# Patient Record
Sex: MALE | Race: WHITE | Employment: FULL TIME | URBAN - METROPOLITAN AREA
[De-identification: names, ages, dates, MRNs, and addresses within clinical notes are randomized per-mention and may not be internally consistent; named-entity substitution may affect disease eponyms.]

---

## 2022-02-04 ENCOUNTER — APPOINTMENT (OUTPATIENT)
Dept: GENERAL RADIOLOGY | Age: 57
DRG: 418 | End: 2022-02-04
Payer: COMMERCIAL

## 2022-02-04 ENCOUNTER — ANESTHESIA EVENT (OUTPATIENT)
Dept: OPERATING ROOM | Age: 57
DRG: 418 | End: 2022-02-04
Payer: COMMERCIAL

## 2022-02-04 ENCOUNTER — APPOINTMENT (OUTPATIENT)
Dept: CT IMAGING | Age: 57
DRG: 418 | End: 2022-02-04
Payer: COMMERCIAL

## 2022-02-04 ENCOUNTER — ANESTHESIA (OUTPATIENT)
Dept: OPERATING ROOM | Age: 57
DRG: 418 | End: 2022-02-04
Payer: COMMERCIAL

## 2022-02-04 ENCOUNTER — HOSPITAL ENCOUNTER (INPATIENT)
Age: 57
LOS: 1 days | Discharge: HOME | DRG: 418 | End: 2022-02-05
Attending: EMERGENCY MEDICINE | Admitting: INTERNAL MEDICINE
Payer: COMMERCIAL

## 2022-02-04 ENCOUNTER — APPOINTMENT (OUTPATIENT)
Dept: ULTRASOUND IMAGING | Age: 57
DRG: 418 | End: 2022-02-04
Payer: COMMERCIAL

## 2022-02-04 VITALS
OXYGEN SATURATION: 94 % | RESPIRATION RATE: 8 BRPM | DIASTOLIC BLOOD PRESSURE: 61 MMHG | TEMPERATURE: 97 F | SYSTOLIC BLOOD PRESSURE: 105 MMHG

## 2022-02-04 DIAGNOSIS — I10 UNCONTROLLED HYPERTENSION: ICD-10-CM

## 2022-02-04 DIAGNOSIS — K81.9 CHOLECYSTITIS: Primary | ICD-10-CM

## 2022-02-04 DIAGNOSIS — R10.11 RIGHT UPPER QUADRANT ABDOMINAL PAIN: ICD-10-CM

## 2022-02-04 LAB
A/G RATIO: 0.9 (ref 1.1–2.2)
ALBUMIN SERPL-MCNC: 3.6 G/DL (ref 3.4–5)
ALP BLD-CCNC: 187 U/L (ref 40–129)
ALT SERPL-CCNC: 35 U/L (ref 10–40)
AMYLASE: 57 U/L (ref 25–115)
ANION GAP SERPL CALCULATED.3IONS-SCNC: 11 MMOL/L (ref 3–16)
APTT: 34.9 SEC (ref 26.2–38.6)
AST SERPL-CCNC: 42 U/L (ref 15–37)
BASOPHILS ABSOLUTE: 0.1 K/UL (ref 0–0.2)
BASOPHILS RELATIVE PERCENT: 1.6 %
BILIRUB SERPL-MCNC: 0.6 MG/DL (ref 0–1)
BUN BLDV-MCNC: 22 MG/DL (ref 7–20)
CALCIUM SERPL-MCNC: 9.3 MG/DL (ref 8.3–10.6)
CHLORIDE BLD-SCNC: 103 MMOL/L (ref 99–110)
CO2: 24 MMOL/L (ref 21–32)
CREAT SERPL-MCNC: 0.7 MG/DL (ref 0.9–1.3)
EKG ATRIAL RATE: 66 BPM
EKG DIAGNOSIS: NORMAL
EKG P AXIS: 65 DEGREES
EKG P-R INTERVAL: 214 MS
EKG Q-T INTERVAL: 436 MS
EKG QRS DURATION: 98 MS
EKG QTC CALCULATION (BAZETT): 457 MS
EKG R AXIS: 15 DEGREES
EKG T AXIS: 20 DEGREES
EKG VENTRICULAR RATE: 66 BPM
EOSINOPHILS ABSOLUTE: 0.1 K/UL (ref 0–0.6)
EOSINOPHILS RELATIVE PERCENT: 2 %
ESTIMATED AVERAGE GLUCOSE: 128.4 MG/DL
ETHANOL: NORMAL MG/DL (ref 0–0.08)
GFR AFRICAN AMERICAN: >60
GFR NON-AFRICAN AMERICAN: >60
GLUCOSE BLD-MCNC: 173 MG/DL (ref 70–99)
HBA1C MFR BLD: 6.1 %
HCT VFR BLD CALC: 44.7 % (ref 40.5–52.5)
HEMOGLOBIN: 15 G/DL (ref 13.5–17.5)
INR BLD: 1.08 (ref 0.88–1.12)
LIPASE: 53 U/L (ref 13–60)
LYMPHOCYTES ABSOLUTE: 1.1 K/UL (ref 1–5.1)
LYMPHOCYTES RELATIVE PERCENT: 15.3 %
MCH RBC QN AUTO: 31.8 PG (ref 26–34)
MCHC RBC AUTO-ENTMCNC: 33.6 G/DL (ref 31–36)
MCV RBC AUTO: 94.8 FL (ref 80–100)
MONOCYTES ABSOLUTE: 1.2 K/UL (ref 0–1.3)
MONOCYTES RELATIVE PERCENT: 17.3 %
NEUTROPHILS ABSOLUTE: 4.5 K/UL (ref 1.7–7.7)
NEUTROPHILS RELATIVE PERCENT: 63.8 %
PDW BLD-RTO: 13.8 % (ref 12.4–15.4)
PLATELET # BLD: 110 K/UL (ref 135–450)
PMV BLD AUTO: 8.4 FL (ref 5–10.5)
POTASSIUM REFLEX MAGNESIUM: 3.8 MMOL/L (ref 3.5–5.1)
PRO-BNP: 73 PG/ML (ref 0–124)
PROTHROMBIN TIME: 12.2 SEC (ref 9.9–12.7)
RBC # BLD: 4.72 M/UL (ref 4.2–5.9)
SARS-COV-2, NAAT: NOT DETECTED
SODIUM BLD-SCNC: 138 MMOL/L (ref 136–145)
TOTAL PROTEIN: 7.6 G/DL (ref 6.4–8.2)
TROPONIN: <0.01 NG/ML
WBC # BLD: 7.1 K/UL (ref 4–11)

## 2022-02-04 PROCEDURE — 3700000001 HC ADD 15 MINUTES (ANESTHESIA): Performed by: SURGERY

## 2022-02-04 PROCEDURE — 2580000003 HC RX 258: Performed by: SURGERY

## 2022-02-04 PROCEDURE — 85730 THROMBOPLASTIN TIME PARTIAL: CPT

## 2022-02-04 PROCEDURE — 7100000000 HC PACU RECOVERY - FIRST 15 MIN: Performed by: SURGERY

## 2022-02-04 PROCEDURE — 0FB04ZX EXCISION OF LIVER, PERCUTANEOUS ENDOSCOPIC APPROACH, DIAGNOSTIC: ICD-10-PCS | Performed by: SURGERY

## 2022-02-04 PROCEDURE — 7100000001 HC PACU RECOVERY - ADDTL 15 MIN: Performed by: SURGERY

## 2022-02-04 PROCEDURE — 80053 COMPREHEN METABOLIC PANEL: CPT

## 2022-02-04 PROCEDURE — 88304 TISSUE EXAM BY PATHOLOGIST: CPT

## 2022-02-04 PROCEDURE — 2709999900 HC NON-CHARGEABLE SUPPLY: Performed by: SURGERY

## 2022-02-04 PROCEDURE — 84484 ASSAY OF TROPONIN QUANT: CPT

## 2022-02-04 PROCEDURE — 47563 LAPARO CHOLECYSTECTOMY/GRAPH: CPT | Performed by: SURGERY

## 2022-02-04 PROCEDURE — 2580000003 HC RX 258: Performed by: EMERGENCY MEDICINE

## 2022-02-04 PROCEDURE — 87635 SARS-COV-2 COVID-19 AMP PRB: CPT

## 2022-02-04 PROCEDURE — 6370000000 HC RX 637 (ALT 250 FOR IP): Performed by: SURGERY

## 2022-02-04 PROCEDURE — 3600000014 HC SURGERY LEVEL 4 ADDTL 15MIN: Performed by: SURGERY

## 2022-02-04 PROCEDURE — 83036 HEMOGLOBIN GLYCOSYLATED A1C: CPT

## 2022-02-04 PROCEDURE — 82150 ASSAY OF AMYLASE: CPT

## 2022-02-04 PROCEDURE — APPNB30 APP NON BILLABLE TIME 0-30 MINS: Performed by: NURSE PRACTITIONER

## 2022-02-04 PROCEDURE — 83690 ASSAY OF LIPASE: CPT

## 2022-02-04 PROCEDURE — A4217 STERILE WATER/SALINE, 500 ML: HCPCS | Performed by: SURGERY

## 2022-02-04 PROCEDURE — 1200000000 HC SEMI PRIVATE

## 2022-02-04 PROCEDURE — 83880 ASSAY OF NATRIURETIC PEPTIDE: CPT

## 2022-02-04 PROCEDURE — 2500000003 HC RX 250 WO HCPCS: Performed by: SURGERY

## 2022-02-04 PROCEDURE — 93010 ELECTROCARDIOGRAM REPORT: CPT | Performed by: INTERNAL MEDICINE

## 2022-02-04 PROCEDURE — 6360000002 HC RX W HCPCS: Performed by: NURSE ANESTHETIST, CERTIFIED REGISTERED

## 2022-02-04 PROCEDURE — 6360000002 HC RX W HCPCS: Performed by: SURGERY

## 2022-02-04 PROCEDURE — 6360000002 HC RX W HCPCS: Performed by: EMERGENCY MEDICINE

## 2022-02-04 PROCEDURE — 74177 CT ABD & PELVIS W/CONTRAST: CPT

## 2022-02-04 PROCEDURE — 96375 TX/PRO/DX INJ NEW DRUG ADDON: CPT

## 2022-02-04 PROCEDURE — 96376 TX/PRO/DX INJ SAME DRUG ADON: CPT

## 2022-02-04 PROCEDURE — 85610 PROTHROMBIN TIME: CPT

## 2022-02-04 PROCEDURE — 2580000003 HC RX 258: Performed by: INTERNAL MEDICINE

## 2022-02-04 PROCEDURE — 99284 EMERGENCY DEPT VISIT MOD MDM: CPT

## 2022-02-04 PROCEDURE — 0FT44ZZ RESECTION OF GALLBLADDER, PERCUTANEOUS ENDOSCOPIC APPROACH: ICD-10-PCS | Performed by: SURGERY

## 2022-02-04 PROCEDURE — 2500000003 HC RX 250 WO HCPCS: Performed by: NURSE ANESTHETIST, CERTIFIED REGISTERED

## 2022-02-04 PROCEDURE — 6360000004 HC RX CONTRAST MEDICATION: Performed by: SURGERY

## 2022-02-04 PROCEDURE — BF131ZZ FLUOROSCOPY OF GALLBLADDER AND BILE DUCTS USING LOW OSMOLAR CONTRAST: ICD-10-PCS | Performed by: SURGERY

## 2022-02-04 PROCEDURE — 6370000000 HC RX 637 (ALT 250 FOR IP): Performed by: INTERNAL MEDICINE

## 2022-02-04 PROCEDURE — 6360000002 HC RX W HCPCS: Performed by: ANESTHESIOLOGY

## 2022-02-04 PROCEDURE — 6370000000 HC RX 637 (ALT 250 FOR IP): Performed by: EMERGENCY MEDICINE

## 2022-02-04 PROCEDURE — G0378 HOSPITAL OBSERVATION PER HR: HCPCS

## 2022-02-04 PROCEDURE — 3700000000 HC ANESTHESIA ATTENDED CARE: Performed by: SURGERY

## 2022-02-04 PROCEDURE — 99222 1ST HOSP IP/OBS MODERATE 55: CPT | Performed by: SURGERY

## 2022-02-04 PROCEDURE — 2720000010 HC SURG SUPPLY STERILE: Performed by: SURGERY

## 2022-02-04 PROCEDURE — 96374 THER/PROPH/DIAG INJ IV PUSH: CPT

## 2022-02-04 PROCEDURE — 74300 X-RAY BILE DUCTS/PANCREAS: CPT

## 2022-02-04 PROCEDURE — 3600000004 HC SURGERY LEVEL 4 BASE: Performed by: SURGERY

## 2022-02-04 PROCEDURE — 6360000004 HC RX CONTRAST MEDICATION: Performed by: EMERGENCY MEDICINE

## 2022-02-04 PROCEDURE — 96365 THER/PROPH/DIAG IV INF INIT: CPT

## 2022-02-04 PROCEDURE — 88313 SPECIAL STAINS GROUP 2: CPT

## 2022-02-04 PROCEDURE — 94760 N-INVAS EAR/PLS OXIMETRY 1: CPT

## 2022-02-04 PROCEDURE — 88307 TISSUE EXAM BY PATHOLOGIST: CPT

## 2022-02-04 PROCEDURE — 47001 NDL BIOPSY LVR TM OTH MAJ PX: CPT | Performed by: SURGERY

## 2022-02-04 PROCEDURE — 82077 ASSAY SPEC XCP UR&BREATH IA: CPT

## 2022-02-04 PROCEDURE — 6370000000 HC RX 637 (ALT 250 FOR IP): Performed by: NURSE PRACTITIONER

## 2022-02-04 PROCEDURE — 85025 COMPLETE CBC W/AUTO DIFF WBC: CPT

## 2022-02-04 PROCEDURE — 36415 COLL VENOUS BLD VENIPUNCTURE: CPT

## 2022-02-04 PROCEDURE — 93005 ELECTROCARDIOGRAM TRACING: CPT | Performed by: EMERGENCY MEDICINE

## 2022-02-04 PROCEDURE — 76705 ECHO EXAM OF ABDOMEN: CPT

## 2022-02-04 RX ORDER — HYDROCODONE BITARTRATE AND ACETAMINOPHEN 5; 325 MG/1; MG/1
1 TABLET ORAL EVERY 6 HOURS PRN
Qty: 12 TABLET | Refills: 0 | Status: SHIPPED | OUTPATIENT
Start: 2022-02-04 | End: 2022-02-09

## 2022-02-04 RX ORDER — SODIUM CHLORIDE 9 MG/ML
25 INJECTION, SOLUTION INTRAVENOUS PRN
Status: DISCONTINUED | OUTPATIENT
Start: 2022-02-04 | End: 2022-02-05 | Stop reason: HOSPADM

## 2022-02-04 RX ORDER — SODIUM CHLORIDE, SODIUM LACTATE, POTASSIUM CHLORIDE, CALCIUM CHLORIDE 600; 310; 30; 20 MG/100ML; MG/100ML; MG/100ML; MG/100ML
INJECTION, SOLUTION INTRAVENOUS CONTINUOUS
Status: ACTIVE | OUTPATIENT
Start: 2022-02-04 | End: 2022-02-05

## 2022-02-04 RX ORDER — MAGNESIUM HYDROXIDE 1200 MG/15ML
LIQUID ORAL CONTINUOUS PRN
Status: COMPLETED | OUTPATIENT
Start: 2022-02-04 | End: 2022-02-04

## 2022-02-04 RX ORDER — HYDRALAZINE HYDROCHLORIDE 20 MG/ML
5 INJECTION INTRAMUSCULAR; INTRAVENOUS EVERY 10 MIN PRN
Status: DISCONTINUED | OUTPATIENT
Start: 2022-02-04 | End: 2022-02-04 | Stop reason: HOSPADM

## 2022-02-04 RX ORDER — HYDROCODONE BITARTRATE AND ACETAMINOPHEN 5; 325 MG/1; MG/1
1 TABLET ORAL EVERY 4 HOURS PRN
Status: DISCONTINUED | OUTPATIENT
Start: 2022-02-04 | End: 2022-02-05 | Stop reason: HOSPADM

## 2022-02-04 RX ORDER — TADALAFIL 5 MG/1
5 TABLET ORAL DAILY PRN
COMMUNITY

## 2022-02-04 RX ORDER — SODIUM CHLORIDE 0.9 % (FLUSH) 0.9 %
5-40 SYRINGE (ML) INJECTION EVERY 12 HOURS SCHEDULED
Status: DISCONTINUED | OUTPATIENT
Start: 2022-02-04 | End: 2022-02-05 | Stop reason: HOSPADM

## 2022-02-04 RX ORDER — LABETALOL HYDROCHLORIDE 5 MG/ML
10 INJECTION, SOLUTION INTRAVENOUS EVERY 4 HOURS PRN
Status: DISCONTINUED | OUTPATIENT
Start: 2022-02-04 | End: 2022-02-05 | Stop reason: HOSPADM

## 2022-02-04 RX ORDER — ONDANSETRON 2 MG/ML
4 INJECTION INTRAMUSCULAR; INTRAVENOUS
Status: DISCONTINUED | OUTPATIENT
Start: 2022-02-04 | End: 2022-02-04 | Stop reason: HOSPADM

## 2022-02-04 RX ORDER — MEPERIDINE HYDROCHLORIDE 25 MG/ML
12.5 INJECTION INTRAMUSCULAR; INTRAVENOUS; SUBCUTANEOUS EVERY 5 MIN PRN
Status: DISCONTINUED | OUTPATIENT
Start: 2022-02-04 | End: 2022-02-04 | Stop reason: HOSPADM

## 2022-02-04 RX ORDER — NICOTINE 21 MG/24HR
1 PATCH, TRANSDERMAL 24 HOURS TRANSDERMAL DAILY
Status: DISCONTINUED | OUTPATIENT
Start: 2022-02-04 | End: 2022-02-05 | Stop reason: HOSPADM

## 2022-02-04 RX ORDER — LABETALOL HYDROCHLORIDE 5 MG/ML
10 INJECTION, SOLUTION INTRAVENOUS EVERY 4 HOURS PRN
Status: DISCONTINUED | OUTPATIENT
Start: 2022-02-04 | End: 2022-02-04

## 2022-02-04 RX ORDER — ONDANSETRON 2 MG/ML
4 INJECTION INTRAMUSCULAR; INTRAVENOUS EVERY 6 HOURS PRN
Status: DISCONTINUED | OUTPATIENT
Start: 2022-02-04 | End: 2022-02-05 | Stop reason: HOSPADM

## 2022-02-04 RX ORDER — FENTANYL CITRATE 50 UG/ML
INJECTION, SOLUTION INTRAMUSCULAR; INTRAVENOUS PRN
Status: DISCONTINUED | OUTPATIENT
Start: 2022-02-04 | End: 2022-02-04 | Stop reason: SDUPTHER

## 2022-02-04 RX ORDER — KETAMINE HCL IN NACL, ISO-OSM 100MG/10ML
SYRINGE (ML) INJECTION PRN
Status: DISCONTINUED | OUTPATIENT
Start: 2022-02-04 | End: 2022-02-04 | Stop reason: SDUPTHER

## 2022-02-04 RX ORDER — LABETALOL HYDROCHLORIDE 5 MG/ML
5 INJECTION, SOLUTION INTRAVENOUS EVERY 10 MIN PRN
Status: DISCONTINUED | OUTPATIENT
Start: 2022-02-04 | End: 2022-02-04 | Stop reason: HOSPADM

## 2022-02-04 RX ORDER — MORPHINE SULFATE 4 MG/ML
4 INJECTION, SOLUTION INTRAMUSCULAR; INTRAVENOUS ONCE
Status: COMPLETED | OUTPATIENT
Start: 2022-02-04 | End: 2022-02-04

## 2022-02-04 RX ORDER — HYDROCODONE BITARTRATE AND ACETAMINOPHEN 5; 325 MG/1; MG/1
2 TABLET ORAL EVERY 4 HOURS PRN
Status: DISCONTINUED | OUTPATIENT
Start: 2022-02-04 | End: 2022-02-05 | Stop reason: HOSPADM

## 2022-02-04 RX ORDER — DEXAMETHASONE SODIUM PHOSPHATE 4 MG/ML
INJECTION, SOLUTION INTRA-ARTICULAR; INTRALESIONAL; INTRAMUSCULAR; INTRAVENOUS; SOFT TISSUE PRN
Status: DISCONTINUED | OUTPATIENT
Start: 2022-02-04 | End: 2022-02-04 | Stop reason: SDUPTHER

## 2022-02-04 RX ORDER — ONDANSETRON 4 MG/1
4 TABLET, ORALLY DISINTEGRATING ORAL EVERY 8 HOURS PRN
Status: DISCONTINUED | OUTPATIENT
Start: 2022-02-04 | End: 2022-02-05 | Stop reason: HOSPADM

## 2022-02-04 RX ORDER — SODIUM CHLORIDE, SODIUM LACTATE, POTASSIUM CHLORIDE, AND CALCIUM CHLORIDE .6; .31; .03; .02 G/100ML; G/100ML; G/100ML; G/100ML
IRRIGANT IRRIGATION
Status: COMPLETED | OUTPATIENT
Start: 2022-02-04 | End: 2022-02-04

## 2022-02-04 RX ORDER — OXYCODONE HYDROCHLORIDE 5 MG/1
5 TABLET ORAL EVERY 4 HOURS PRN
Status: DISCONTINUED | OUTPATIENT
Start: 2022-02-04 | End: 2022-02-04

## 2022-02-04 RX ORDER — MIDAZOLAM HYDROCHLORIDE 1 MG/ML
INJECTION INTRAMUSCULAR; INTRAVENOUS PRN
Status: DISCONTINUED | OUTPATIENT
Start: 2022-02-04 | End: 2022-02-04 | Stop reason: SDUPTHER

## 2022-02-04 RX ORDER — CALCIUM CARBONATE 200(500)MG
500 TABLET,CHEWABLE ORAL 3 TIMES DAILY PRN
Status: DISCONTINUED | OUTPATIENT
Start: 2022-02-04 | End: 2022-02-05 | Stop reason: HOSPADM

## 2022-02-04 RX ORDER — BUPIVACAINE HYDROCHLORIDE 5 MG/ML
INJECTION, SOLUTION EPIDURAL; INTRACAUDAL
Status: COMPLETED | OUTPATIENT
Start: 2022-02-04 | End: 2022-02-04

## 2022-02-04 RX ORDER — ONDANSETRON 2 MG/ML
INJECTION INTRAMUSCULAR; INTRAVENOUS PRN
Status: DISCONTINUED | OUTPATIENT
Start: 2022-02-04 | End: 2022-02-04 | Stop reason: SDUPTHER

## 2022-02-04 RX ORDER — LIDOCAINE HYDROCHLORIDE 20 MG/ML
INJECTION, SOLUTION INFILTRATION; PERINEURAL PRN
Status: DISCONTINUED | OUTPATIENT
Start: 2022-02-04 | End: 2022-02-04 | Stop reason: SDUPTHER

## 2022-02-04 RX ORDER — ROCURONIUM BROMIDE 10 MG/ML
INJECTION, SOLUTION INTRAVENOUS PRN
Status: DISCONTINUED | OUTPATIENT
Start: 2022-02-04 | End: 2022-02-04 | Stop reason: SDUPTHER

## 2022-02-04 RX ORDER — SUCCINYLCHOLINE CHLORIDE 20 MG/ML
INJECTION INTRAMUSCULAR; INTRAVENOUS PRN
Status: DISCONTINUED | OUTPATIENT
Start: 2022-02-04 | End: 2022-02-04 | Stop reason: SDUPTHER

## 2022-02-04 RX ORDER — MORPHINE SULFATE 2 MG/ML
2 INJECTION, SOLUTION INTRAMUSCULAR; INTRAVENOUS EVERY 4 HOURS PRN
Status: DISCONTINUED | OUTPATIENT
Start: 2022-02-04 | End: 2022-02-05 | Stop reason: HOSPADM

## 2022-02-04 RX ORDER — ONDANSETRON 2 MG/ML
4 INJECTION INTRAMUSCULAR; INTRAVENOUS EVERY 6 HOURS PRN
Status: DISCONTINUED | OUTPATIENT
Start: 2022-02-04 | End: 2022-02-04

## 2022-02-04 RX ORDER — HYDROMORPHONE HCL 110MG/55ML
0.5 PATIENT CONTROLLED ANALGESIA SYRINGE INTRAVENOUS EVERY 5 MIN PRN
Status: COMPLETED | OUTPATIENT
Start: 2022-02-04 | End: 2022-02-04

## 2022-02-04 RX ORDER — SODIUM CHLORIDE 0.9 % (FLUSH) 0.9 %
10 SYRINGE (ML) INJECTION PRN
Status: DISCONTINUED | OUTPATIENT
Start: 2022-02-04 | End: 2022-02-05 | Stop reason: HOSPADM

## 2022-02-04 RX ORDER — MORPHINE SULFATE 2 MG/ML
2 INJECTION, SOLUTION INTRAMUSCULAR; INTRAVENOUS
Status: DISCONTINUED | OUTPATIENT
Start: 2022-02-04 | End: 2022-02-05 | Stop reason: HOSPADM

## 2022-02-04 RX ORDER — AMLODIPINE BESYLATE 5 MG/1
5 TABLET ORAL DAILY
Status: DISCONTINUED | OUTPATIENT
Start: 2022-02-04 | End: 2022-02-05 | Stop reason: HOSPADM

## 2022-02-04 RX ORDER — ACETAMINOPHEN 650 MG/1
650 SUPPOSITORY RECTAL EVERY 6 HOURS PRN
Status: DISCONTINUED | OUTPATIENT
Start: 2022-02-04 | End: 2022-02-05 | Stop reason: HOSPADM

## 2022-02-04 RX ORDER — ACETAMINOPHEN 325 MG/1
650 TABLET ORAL EVERY 6 HOURS PRN
Status: DISCONTINUED | OUTPATIENT
Start: 2022-02-04 | End: 2022-02-05 | Stop reason: HOSPADM

## 2022-02-04 RX ORDER — PANTOPRAZOLE SODIUM 40 MG/1
40 TABLET, DELAYED RELEASE ORAL
Status: DISCONTINUED | OUTPATIENT
Start: 2022-02-04 | End: 2022-02-05 | Stop reason: HOSPADM

## 2022-02-04 RX ORDER — POLYETHYLENE GLYCOL 3350 17 G/17G
17 POWDER, FOR SOLUTION ORAL DAILY PRN
Status: DISCONTINUED | OUTPATIENT
Start: 2022-02-04 | End: 2022-02-05 | Stop reason: HOSPADM

## 2022-02-04 RX ORDER — PROPRANOLOL HYDROCHLORIDE 20 MG/1
10 TABLET ORAL 3 TIMES DAILY
Status: DISCONTINUED | OUTPATIENT
Start: 2022-02-04 | End: 2022-02-05 | Stop reason: HOSPADM

## 2022-02-04 RX ORDER — GLYCOPYRROLATE 0.2 MG/ML
INJECTION INTRAMUSCULAR; INTRAVENOUS PRN
Status: DISCONTINUED | OUTPATIENT
Start: 2022-02-04 | End: 2022-02-04 | Stop reason: SDUPTHER

## 2022-02-04 RX ORDER — PROPOFOL 10 MG/ML
INJECTION, EMULSION INTRAVENOUS PRN
Status: DISCONTINUED | OUTPATIENT
Start: 2022-02-04 | End: 2022-02-04 | Stop reason: SDUPTHER

## 2022-02-04 RX ADMIN — MIDAZOLAM 2 MG: 1 INJECTION INTRAMUSCULAR; INTRAVENOUS at 17:24

## 2022-02-04 RX ADMIN — Medication 10 ML: at 20:31

## 2022-02-04 RX ADMIN — AMLODIPINE BESYLATE 5 MG: 5 TABLET ORAL at 14:13

## 2022-02-04 RX ADMIN — ROCURONIUM BROMIDE 5 MG: 10 INJECTION, SOLUTION INTRAVENOUS at 17:33

## 2022-02-04 RX ADMIN — ROCURONIUM BROMIDE 35 MG: 10 INJECTION, SOLUTION INTRAVENOUS at 17:37

## 2022-02-04 RX ADMIN — PIPERACILLIN AND TAZOBACTAM 3375 MG: 3; .375 INJECTION, POWDER, LYOPHILIZED, FOR SOLUTION INTRAVENOUS at 17:21

## 2022-02-04 RX ADMIN — IOPAMIDOL 75 ML: 755 INJECTION, SOLUTION INTRAVENOUS at 02:28

## 2022-02-04 RX ADMIN — Medication 30 MG: at 17:52

## 2022-02-04 RX ADMIN — FENTANYL CITRATE 50 MCG: 50 INJECTION, SOLUTION INTRAMUSCULAR; INTRAVENOUS at 17:33

## 2022-02-04 RX ADMIN — MORPHINE SULFATE 2 MG: 2 INJECTION, SOLUTION INTRAMUSCULAR; INTRAVENOUS at 20:49

## 2022-02-04 RX ADMIN — PROPRANOLOL HYDROCHLORIDE 10 MG: 20 TABLET ORAL at 08:20

## 2022-02-04 RX ADMIN — LIDOCAINE HYDROCHLORIDE 100 MG: 20 INJECTION, SOLUTION INFILTRATION; PERINEURAL at 17:33

## 2022-02-04 RX ADMIN — FENTANYL CITRATE 50 MCG: 50 INJECTION, SOLUTION INTRAMUSCULAR; INTRAVENOUS at 17:51

## 2022-02-04 RX ADMIN — ONDANSETRON 4 MG: 2 INJECTION INTRAMUSCULAR; INTRAVENOUS at 01:46

## 2022-02-04 RX ADMIN — HYDROMORPHONE HYDROCHLORIDE 0.5 MG: 2 INJECTION, SOLUTION INTRAMUSCULAR; INTRAVENOUS; SUBCUTANEOUS at 18:55

## 2022-02-04 RX ADMIN — ONDANSETRON 4 MG: 2 INJECTION INTRAMUSCULAR; INTRAVENOUS at 17:43

## 2022-02-04 RX ADMIN — GLYCOPYRROLATE 0.2 MG: 0.2 INJECTION, SOLUTION INTRAMUSCULAR; INTRAVENOUS at 17:45

## 2022-02-04 RX ADMIN — SUCCINYLCHOLINE CHLORIDE 120 MG: 20 INJECTION, SOLUTION INTRAMUSCULAR; INTRAVENOUS at 17:34

## 2022-02-04 RX ADMIN — LIDOCAINE HYDROCHLORIDE: 20 SOLUTION ORAL; TOPICAL at 01:46

## 2022-02-04 RX ADMIN — SODIUM CHLORIDE, POTASSIUM CHLORIDE, SODIUM LACTATE AND CALCIUM CHLORIDE: 600; 310; 30; 20 INJECTION, SOLUTION INTRAVENOUS at 06:17

## 2022-02-04 RX ADMIN — DEXAMETHASONE SODIUM PHOSPHATE 8 MG: 4 INJECTION, SOLUTION INTRAMUSCULAR; INTRAVENOUS at 17:41

## 2022-02-04 RX ADMIN — HYDROMORPHONE HYDROCHLORIDE 0.5 MG: 2 INJECTION, SOLUTION INTRAMUSCULAR; INTRAVENOUS; SUBCUTANEOUS at 18:50

## 2022-02-04 RX ADMIN — SUGAMMADEX 200 MG: 100 INJECTION, SOLUTION INTRAVENOUS at 18:19

## 2022-02-04 RX ADMIN — Medication 10 ML: at 08:42

## 2022-02-04 RX ADMIN — HYDROMORPHONE HYDROCHLORIDE 0.5 MG: 2 INJECTION, SOLUTION INTRAMUSCULAR; INTRAVENOUS; SUBCUTANEOUS at 19:11

## 2022-02-04 RX ADMIN — PIPERACILLIN AND TAZOBACTAM 3375 MG: 3; .375 INJECTION, POWDER, LYOPHILIZED, FOR SOLUTION INTRAVENOUS at 03:07

## 2022-02-04 RX ADMIN — MORPHINE SULFATE 4 MG: 4 INJECTION INTRAVENOUS at 01:46

## 2022-02-04 RX ADMIN — PROPOFOL 50 MG: 10 INJECTION, EMULSION INTRAVENOUS at 18:07

## 2022-02-04 RX ADMIN — HYDROMORPHONE HYDROCHLORIDE 0.5 MG: 2 INJECTION, SOLUTION INTRAMUSCULAR; INTRAVENOUS; SUBCUTANEOUS at 19:00

## 2022-02-04 RX ADMIN — PROPRANOLOL HYDROCHLORIDE 10 MG: 20 TABLET ORAL at 13:34

## 2022-02-04 RX ADMIN — PIPERACILLIN AND TAZOBACTAM 3375 MG: 3; .375 INJECTION, POWDER, LYOPHILIZED, FOR SOLUTION INTRAVENOUS at 08:23

## 2022-02-04 RX ADMIN — PROPRANOLOL HYDROCHLORIDE 10 MG: 20 TABLET ORAL at 20:50

## 2022-02-04 RX ADMIN — PROPOFOL 200 MG: 10 INJECTION, EMULSION INTRAVENOUS at 17:33

## 2022-02-04 ASSESSMENT — PULMONARY FUNCTION TESTS
PIF_VALUE: 20
PIF_VALUE: 20
PIF_VALUE: 16
PIF_VALUE: 1
PIF_VALUE: 19
PIF_VALUE: 11
PIF_VALUE: 16
PIF_VALUE: 21
PIF_VALUE: 6
PIF_VALUE: 3
PIF_VALUE: 21
PIF_VALUE: 13
PIF_VALUE: 3
PIF_VALUE: 20
PIF_VALUE: 1
PIF_VALUE: 20
PIF_VALUE: 20
PIF_VALUE: 6
PIF_VALUE: 20
PIF_VALUE: 0
PIF_VALUE: 20
PIF_VALUE: 14
PIF_VALUE: 1
PIF_VALUE: 0
PIF_VALUE: 20
PIF_VALUE: 18
PIF_VALUE: 20
PIF_VALUE: 17
PIF_VALUE: 19
PIF_VALUE: 20
PIF_VALUE: 13
PIF_VALUE: 21
PIF_VALUE: 19
PIF_VALUE: 19
PIF_VALUE: 15
PIF_VALUE: 12
PIF_VALUE: 14
PIF_VALUE: 18
PIF_VALUE: 19
PIF_VALUE: 1
PIF_VALUE: 11
PIF_VALUE: 3
PIF_VALUE: 12
PIF_VALUE: 20
PIF_VALUE: 20
PIF_VALUE: 15
PIF_VALUE: 13
PIF_VALUE: 1
PIF_VALUE: 20
PIF_VALUE: 16
PIF_VALUE: 1
PIF_VALUE: 18
PIF_VALUE: 19
PIF_VALUE: 13
PIF_VALUE: 20
PIF_VALUE: 19
PIF_VALUE: 15
PIF_VALUE: 20
PIF_VALUE: 19
PIF_VALUE: 1

## 2022-02-04 ASSESSMENT — PAIN SCALES - GENERAL
PAINLEVEL_OUTOF10: 5
PAINLEVEL_OUTOF10: 7
PAINLEVEL_OUTOF10: 7
PAINLEVEL_OUTOF10: 9
PAINLEVEL_OUTOF10: 7
PAINLEVEL_OUTOF10: 7
PAINLEVEL_OUTOF10: 5
PAINLEVEL_OUTOF10: 8
PAINLEVEL_OUTOF10: 0

## 2022-02-04 ASSESSMENT — PAIN DESCRIPTION - PAIN TYPE
TYPE: SURGICAL PAIN

## 2022-02-04 ASSESSMENT — PAIN DESCRIPTION - LOCATION
LOCATION: ABDOMEN

## 2022-02-04 ASSESSMENT — LIFESTYLE VARIABLES: SMOKING_STATUS: 1

## 2022-02-04 NOTE — ED PROVIDER NOTES
[facility]        Pt Name: Bee Olsen  MRN: [id number]  [place] 1965  Date of evaluation: 2/4/2022  Provider: Norma Lorenz MD  PCP: Efrain Portillo MD    This patient was seen and evaluated by the attending physician No att. providers found. CHIEF COMPLAINT       Chief Complaint   Patient presents with    Abdominal Pain     Pt c/o abdominal pain under the ribs starting around 9pm tonight with N/V. Pt denies blood in emesis. Denies trauma to site but states he was exercising yesterday. HISTORY OF PRESENT ILLNESS   (Location/Symptom, Timing/Onset, Context/Setting, Quality, Duration, Modifying Factors, Severity)  Note limiting factors. Bee Olsen is a 62 y.o. male here today with a chief complaint of epigastric abdominal pain with radiation right upper quadrant into his chest and throat all the way to his teeth. Some associated nausea but no vomiting or diarrhea. No fevers chills sweats no headaches no chest pain no dyspnea no palpitations. Denies any significant past medical history but appears have history of some liver disease from prior alcohol abuse. Also patient had a pertinent hx for for paracentesis and a herniarraphy. Nursing Notes were all reviewed and agreed with or any disagreements were addressed  in the HPI. REVIEW OF SYSTEMS    (2-9 systems for level 4, 10 or more for level 5)     Review of Systems    Positives and Pertinent negatives as per HPI. Except as noted abovein the ROS, all other systems were reviewed and negative. PAST MEDICAL HISTORY     Past Medical History:   Diagnosis Date    Alcohol abuse     Liver disease     hyperbilirubinemia         SURGICAL HISTORY     Past Surgical History:   Procedure Laterality Date    PARACENTESIS  12/28/10         CURRENTMEDICATIONS       Previous Medications    MULTIPLE VITAMIN (MULTIVITAMINS PO)    Take  by mouth.          ALLERGIES Patient has no known allergies. FAMILYHISTORY       Family History   Problem Relation Age of Onset    Heart Disease Father 48    High Blood Pressure Mother           SOCIAL HISTORY       Social History     Socioeconomic History    Marital status:      Spouse name: None    Number of children: None    Years of education: None    Highest education level: None   Occupational History    None   Tobacco Use    Smoking status: Current Every Day Smoker     Packs/day: 0.50     Years: 20.00     Pack years: 10.00     Types: Cigarettes     Last attempt to quit: 12/3/2010     Years since quittin.1    Smokeless tobacco: Never Used    Tobacco comment: less than half pack/day   Vaping Use    Vaping Use: Every day    Devices: Pre-filled or refillable cartridge   Substance and Sexual Activity    Alcohol use: No     Comment: pt states has not drank in a month but did drink before that a 1/2 liter to 3/4 a day.  Drug use: No    Sexual activity: Yes     Partners: Female   Other Topics Concern    None   Social History Narrative    None     Social Determinants of Health     Financial Resource Strain:     Difficulty of Paying Living Expenses: Not on file   Food Insecurity:     Worried About Running Out of Food in the Last Year: Not on file    Telly of Food in the Last Year: Not on file   Transportation Needs:     Lack of Transportation (Medical): Not on file    Lack of Transportation (Non-Medical):  Not on file   Physical Activity:     Days of Exercise per Week: Not on file    Minutes of Exercise per Session: Not on file   Stress:     Feeling of Stress : Not on file   Social Connections:     Frequency of Communication with Friends and Family: Not on file    Frequency of Social Gatherings with Friends and Family: Not on file    Attends Mandaeism Services: Not on file    Active Member of Clubs or Organizations: Not on file    Attends Club or Organization Meetings: Not on file    Marital Status: Not on file   Intimate Partner Violence:     Fear of Current or Ex-Partner: Not on file    Emotionally Abused: Not on file    Physically Abused: Not on file    Sexually Abused: Not on file   Housing Stability:     Unable to Pay for Housing in the Last Year: Not on file    Number of [place] in the Last Year: Not on file    Unstable Housing in the Last Year: Not on file       SCREENINGS    [place] Coma Scale  Eye Opening: Spontaneous  Best Verbal Response: Oriented  Best Motor Response: Obeys commands  [place] Coma Scale Score: 15        PHYSICAL EXAM    (up to 7 for level 4, 8 or more for level 5)     ED Triage Vitals [02/04/22 0109]   BP Temp Temp src Pulse Resp SpO2 Height Weight   (!) 195/114 97.6 °F (36.4 °C) -- 73 20 99 % 5' 11\" (1.803 m) 185 lb (83.9 kg)       Physical Exam     General Appearance:  Alert, cooperative, no distress, appears stated age. Head:  Normocephalic, without obvious abnormality, atraumatic. Eyes:  conjunctiva/corneas clear, EOM's intact. Sclera anicteric. ENT: Mucous membranes moist.   Neck: Supple, symmetrical, trachea midline, no adenopathy. No jugular venous distention. Lungs:   No Respiratory Distress. Chest Wall:  Attrauamtic   Heart:  RRR   Abdomen:   Soft, TTP to epigastrium   Extremities:  Full range of motion. Pulses: Symmetric x4   Skin:  No rashes or lesions to exposed skin. Neurologic: Alert and oriented X 3. Motor grossly normal.  Speech clear.           DIAGNOSTIC RESULTS   LABS:    Labs Reviewed   CBC WITH AUTO DIFFERENTIAL - Abnormal; Notable for the following components:       Result Value    Platelets 071 (*)     All other components within normal limits    Narrative:     Performed at:  [facility] [address], [place], [address]   Phone [phone]   COMPREHENSIVE METABOLIC PANEL W/ REFLEX TO MG FOR LOW K - Abnormal; Notable for the following components:    Glucose 173 (*)     BUN 22 (*) CREATININE 0.7 (*)     Albumin/Globulin Ratio 0.9 (*)     Alkaline Phosphatase 187 (*)     AST 42 (*)     All other components within normal limits    Narrative:     Performed at:  [facility]  [address] [place], [address] [place]   Phone [phone]   CULTURE, URINE   LIPASE    Narrative:     Performed at:  [facility] [address] [place], [address]   Phone [phone]   AMYLASE    Narrative:     Performed at:  [facility] [address] [place], [address]   Phone [phone]   TROPONIN    Narrative:     Performed at:  [facility] [address], [address]   Phone [phone]   BRAIN NATRIURETIC PEPTIDE    Narrative:     Performed at:  [facility] [address], [address]   Phone [phone]   PROTIME-INR    Narrative:     Performed at:  [facility] [address] [place], [address]   Phone [phone]   APTT    Narrative:     Performed at:  [facility] [address], [address]   Phone [phone]   ETHANOL    Narrative:     Performed at:  [facility] [address], [address]   Phone [phone]   URINALYSIS       All other labs were within normal range or not returned as of this dictation. EKG: All EKG's are interpreted by the Emergency Department Physician in the absence of a cardiologist.  Please see their note for interpretation of EKG. EKG is reviewed by myself. Dated today at 80. Rate 66. Sinus rhythm with first-degree blockade. There is no change from prior.     RADIOLOGY:   Non-plain film images such as CT, Ultrasound and MRI are read by the radiologist. Plain radiographic images are visualized andpreliminarily interpreted by the  ED Provider with the below findings:        Interpretation perthe Radiologist below, if available at the time of this note:    CT ABDOMEN PELVIS W IV CONTRAST Additional Contrast? None   Final Result   1. The enhancement of the pancreas is homogeneous without evidence of acute   pancreatitis by CT. Small calcifications at the pancreatic head and uncinate   process are likely sequela of chronic pancreatitis. 2.  Gallbladder is dilated with calcified stones at the gallbladder neck and   likely in the cystic duct. The common bile duct is not dilated. Correlate   clinically and consider gallbladder ultrasound. 3.  Cirrhotic liver with portal hypertension and numerous varices. The main   portal vein is still patent. 4.  No bowel obstruction, ascites, or pneumoperitoneum. Mild fecalization of   the distal ileum but without large fecal load in the ascending colon at this   time. No results found.         PROCEDURES   Unless otherwise noted below, none     Procedures    CRITICAL CARE TIME   N/A    CONSULTS:  IP CONSULT TO GENERAL SURGERY  IP CONSULT TO HOSPITALIST      EMERGENCY DEPARTMENT COURSE and DIFFERENTIAL DIAGNOSIS/MDM:   Vitals:    Vitals:    02/04/22 0109   BP: (!) 195/114   Pulse: 73   Resp: 20   Temp: 97.6 °F (36.4 °C)   SpO2: 99%   Weight: 185 lb (83.9 kg)   Height: 5' 11\" (1.803 m)       Patient was given thefollowing medications:  Medications   ondansetron (ZOFRAN) injection 4 mg (4 mg IntraVENous Given 2/4/22 0146)   piperacillin-tazobactam (ZOSYN) 3,375 mg in dextrose 5 % 50 mL IVPB (mini-bag) (3,375 mg IntraVENous New Bag 2/4/22 0307)   morphine injection 4 mg (4 mg IntraVENous Given 2/4/22 0146)   aluminum & magnesium hydroxide-simethicone (MAALOX) 30 mL, lidocaine viscous hcl (XYLOCAINE) 5 mL (GI COCKTAIL) ( Oral Given 2/4/22 0146)   iopamidol (ISOVUE-370) 76 % injection 75 mL (75 mLs IntraVENous Given 2/4/22 0228)       59-year-old male here today with chief complaint epigastric abdominal

## 2022-02-04 NOTE — ANESTHESIA POSTPROCEDURE EVALUATION
Department of Anesthesiology  Postprocedure Note    Patient: Sudeep Talley  MRN: [id number]  YOB: 1965  Date of evaluation: 2/4/2022  Time:  6:38 PM     Procedure Summary     Date: 02/04/22 Room / Location: [address]    Anesthesia Start: 9299 Anesthesia Stop: 1837    Procedure: LAPAROSCOPIC CHOLECYSTECTOMY WITH INTRAOPERATIVE CHOLANGIOGRAM (N/A Abdomen) Diagnosis: (Cholelithiasis)    Surgeons: Sara Magallanes MD Responsible Provider: Germaine Hsu MD    Anesthesia Type: general ASA Status: 3          Anesthesia Type: general    Yenny Phase I: Yenny Score: 10    Yenny Phase II:      Last vitals: Reviewed and per EMR flowsheets.        Anesthesia Post Evaluation    Patient location during evaluation: PACU  Patient participation: complete - patient participated  Level of consciousness: awake and alert  Airway patency: patent  Nausea & Vomiting: no vomiting and no nausea  Complications: no  Cardiovascular status: hemodynamically stable  Respiratory status: acceptable  Hydration status: stable

## 2022-02-04 NOTE — PROGRESS NOTES
accessory muscles. Good inspiratory effort. Clear to auscultation bilaterally, no wheeze or crackles. GI: Abdomen soft, no tenderness, not distended, normal bowel sounds  Musculoskeletal: No cyanosis in digits, neck supple  Neurology: CN 2-12 grossly intact. No speech or motor deficits  Psych: Normal affect. Alert and oriented in time, place and person  Skin: Warm, dry, normal turgor    Labs and Tests:  CBC:   Recent Labs     02/04/22 0145   WBC 7.1   HGB 15.0   *     BMP:    Recent Labs     02/04/22 0145      K 3.8      CO2 24   BUN 22*   CREATININE 0.7*   GLUCOSE 173*     Hepatic:   Recent Labs     02/04/22 0145   AST 42*   ALT 35   BILITOT 0.6   ALKPHOS 187*     US   1. Fatty infiltration of the liver. 2. Cholelithiasis. CT abd and pelvis:   The enhancement of the pancreas is homogeneous without evidence of acute   pancreatitis by CT.  Small calcifications at the pancreatic head and uncinate   process are likely sequela of chronic pancreatitis.       2.  Gallbladder is dilated with calcified stones at the gallbladder neck and   likely in the cystic duct.  The common bile duct is not dilated.  Correlate   clinically and consider gallbladder ultrasound.       3.  Cirrhotic liver with portal hypertension and numerous varices.  The main   portal vein is still patent.       4.  No bowel obstruction, ascites, or pneumoperitoneum.  Mild fecalization of   the distal ileum but without large fecal load in the ascending colon at this   time. AIC 6.1       Problem List  Active Problems:    Right upper quadrant abdominal pain  Resolved Problems:    * No resolved hospital problems. *       Assessment & Plan:   1. Cholelithiasis:  No current reports of pain. Imaging reviewed. He has been evaluated by surgery. Plan on lap manuel later today  2. Hyperglycemia:  AIC 6.1 represent pre diabetes ,  Needs weight reduction, diet and exercise. 3. HTN:  I added norvasc for now.   Will follow 4. Tobacco use:  Cessation encouraged , pt reports he is cutting back   5. Hx of portal hypertension, cirrhosis and esophageal varices.   Pt reports that he is no longer drinking       Diet: Diet NPO Exceptions are: Sips of Water with Meds  Code:Full Code  DVT PPX      GLEN John - CNP   2/4/2022 7:54 AM

## 2022-02-04 NOTE — CONSULTS
[facility]        PATIENT NAME: Jordyn Ballard      TODAY'S DATE: 2/4/2022    Reason for Consult:  Abd pain    Requesting Physician:  Dr. Minor Tejada:              The patient is a 62 y.o. male who presents with abd pain, sharp, severe, focal, in the RUQ, more with pressure, improved today after meds and bowel rest overnight. The pt has had symptoms for 1 day. He has had associated symptoms of nausea. Testing has included US and CT. This showed findings of gallbladder disease and underlying cirrhosis. Has past ETOH abuse hx, does well now, no anemiia or acute GIB, LFT historically ok, is compensated.       Past Medical History:        Diagnosis Date    Alcohol abuse     Liver disease     hyperbilirubinemia       Past Surgical History:        Procedure Laterality Date    PARACENTESIS  12/28/10       Current Medications:   Current Facility-Administered Medications: sodium chloride flush 0.9 % injection 5-40 mL, 5-40 mL, IntraVENous, 2 times per day  sodium chloride flush 0.9 % injection 10 mL, 10 mL, IntraVENous, PRN  0.9 % sodium chloride infusion, 25 mL, IntraVENous, PRN  enoxaparin (LOVENOX) injection 40 mg, 40 mg, SubCUTAneous, Daily  ondansetron (ZOFRAN-ODT) disintegrating tablet 4 mg, 4 mg, Oral, Q8H PRN **OR** ondansetron (ZOFRAN) injection 4 mg, 4 mg, IntraVENous, Q6H PRN  polyethylene glycol (GLYCOLAX) packet 17 g, 17 g, Oral, Daily PRN  acetaminophen (TYLENOL) tablet 650 mg, 650 mg, Oral, Q6H PRN **OR** acetaminophen (TYLENOL) suppository 650 mg, 650 mg, Rectal, Q6H PRN  nicotine (NICODERM CQ) 21 MG/24HR 1 patch, 1 patch, TransDERmal, Daily  lactated ringers infusion, , IntraVENous, Continuous  morphine (PF) injection 2 mg, 2 mg, IntraVENous, Q4H PRN  pantoprazole (PROTONIX) tablet 40 mg, 40 mg, Oral, QAM AC  calcium carbonate (TUMS) chewable tablet 500 mg, 500 mg, Oral, TID PRN  propranolol (INDERAL) tablet 10 mg, 10 mg, Oral, TID  labetalol (NORMODYNE;TRANDATE) injection 10 mg, 10 mg, IntraVENous, Q4H PRN  piperacillin-tazobactam (ZOSYN) 3,375 mg in dextrose 5 % 50 mL IVPB extended infusion (mini-bag), 3,375 mg, IntraVENous, Q8H  Prior to Admission medications    Medication Sig Start Date End Date Taking? Authorizing Provider   tadalafil (CIALIS) 5 MG tablet Take 5 mg by mouth daily as needed for Erectile Dysfunction   Yes Historical Provider, MD   HYDROcodone-acetaminophen (NORCO) 5-325 MG per tablet Take 1 tablet by mouth every 6 hours as needed for Pain for up to 5 days. Take lowest dose possible to manage pain; may stop taking sooner than 7 days if pain is able to be controlled with non-narcotic analgesics and therapies. 2/4/22 2/9/22 Yes GLEN Webber - CNP   Multiple Vitamin (MULTIVITAMINS PO) Take  by mouth. Historical Provider, MD        Allergies:  Patient has no known allergies. Social History:    reports that he has been smoking cigarettes. He has a 10.00 pack-year smoking history. He has never used smokeless tobacco. He reports that he does not drink alcohol and does not use drugs.     Family History:        Problem Relation Age of Onset    Heart Disease Father 48    High Blood Pressure Mother        REVIEW OF SYSTEMS:  CONSTITUTIONAL:  negative  HEENT:  negative  RESPIRATORY:  negative  CARDIOVASCULAR:  negative  GASTROINTESTINAL:  negative except for abdominal pain  GENITOURINARY:  negative  HEMATOLOGIC/LYMPHATIC:  negative  NEUROLOGICAL:  negative  SKIN: negative    PHYSICAL EXAM:  VITALS:  BP (!) 156/80   Pulse 58   Temp 98.4 °F (36.9 °C) (Oral)   Resp 16   Ht 5' 11\" (1.803 m)   Wt 185 lb (83.9 kg)   SpO2 95%   BMI 25.80 kg/m²     CONSTITUTIONAL:  alert, no apparent distress and normal weight  EYES:  sclera clear  ENT:  normocepalic, without obvious abnormality  NECK:  supple, symmetrical, trachea midline  LUNGS:  clear to auscultation  CARDIOVASCULAR:  regular rate and rhythm  ABDOMEN:  no scars, normal bowel sounds, soft, non-distended, tenderness noted in the right upper quadrant Rincon's sign is absent, voluntary guarding absent, no masses palpated, no hepatosplenomegally and hernia absent  MUSCULOSKELETAL:  0+ pitting edema lower extremities  NEUROLOGIC:  Mental Status Exam:  Level of Alertness:   awake  Orientation:   person, place, time  SKIN:  no bruising or bleeding, normal skin color, texture, turgor and no redness, warmth, or swelling    DATA:    CBC:   Recent Labs     02/04/22 0145   WBC 7.1   HGB 15.0   HCT 44.7   *     BMP:    Recent Labs     02/04/22 0145      K 3.8      CO2 24   BUN 22*   CREATININE 0.7*   GLUCOSE 173*     Hepatic:   Recent Labs     02/04/22 0145   AST 42*   ALT 35   BILITOT 0.6   ALKPHOS 187*     Mag:    No results for input(s): MG in the last 72 hours. Phos:   No results for input(s): PHOS in the last 72 hours. INR:   Recent Labs     02/04/22 0145   INR 1.08     LIPASE:   Recent Labs     02/04/22 0145   LIPASE 53.0      AMYLASE:   Recent Labs     02/04/22 0145   AMYLASE 62        CT ABDOMEN PELVIS W IV CONTRAST Additional Contrast? None    Result Date: 2/4/2022  EXAMINATION: CT OF THE ABDOMEN AND PELVIS WITH CONTRAST 2/4/2022 2:21 am TECHNIQUE: CT of the abdomen and pelvis was performed with the administration of intravenous contrast. Multiplanar reformatted images are provided for review. Dose modulation, iterative reconstruction, and/or weight based adjustment of the mA/kV was utilized to reduce the radiation dose to as low as reasonably achievable.  COMPARISON: 12/28/2010 HISTORY: ORDERING SYSTEM PROVIDED HISTORY: Eval for pancreatitis TECHNOLOGIST PROVIDED HISTORY: Additional Contrast?->None Reason for exam:->Eval for pancreatitis Decision Support Exception - unselect if not a suspected or confirmed emergency medical condition->Emergency Medical Condition (MA) Reason for Exam: Eval for pancreatitis Relevant Medical/Surgical History: Abdominal Pain (Pt c/o abdominal pain under the ribs starting around 9pm tonight with N/V. Pt denies blood in emesis. Denies trauma to site but states he was exercising yesterday. ) FINDINGS: Lower Chest: Some dependent atelectasis in the lower lungs but no pneumonia or pleural effusion. Organs: Lobular contour of the liver without primary mass or diffuse nodules identified. The portal vein is patent with recannulization of the umbilical vein. The gallbladder is dilated with calcified gallstones at the gallbladder neck and likely into the cystic duct. Distal common bile duct is not dilated. The spleen is not enlarged. Extensive varices are noted in the gastroesophageal, gastric, and spleno renal sites. Small calcifications are present in the pancreatic head and uncinate process. The enhancement pattern of the parenchyma appears homogeneous. Pancreatic duct is not dilated and no focal fat stranding/fluid is seen along the pancreatic bed. The adrenal glands are not enlarged. Kidneys are enhancing equally with several small cysts not fully evaluated. There is no hydronephrosis or hydroureter on either side. GI/Bowel: The stomach is under distended but the wall appears thickened. The small bowel and colon are not dilated. Mild fecalization of the terminal ileum but only small fecal load in the ascending colon at this time. There is no sign of appendicitis or acute colonic diverticulitis. There is no ascites or pneumoperitoneum. Pelvis: The urinary bladder wall is not thickened. The prostate is enlarged. Peritoneum/Retroperitoneum: No abdominal aortic aneurysm but does have atherosclerosis and tortuosity. No retroperitoneal hematoma. Mildly enlarged upper abdominal lymph nodes are present in the periportal and gastrohepatic regions. Bones/Soft Tissues: Some varices in the anterior abdominal wall are also present. No acute fracture or destruction is seen at the bones. There are degenerative changes in the spine. 1.  The enhancement of the pancreas is homogeneous without evidence of acute pancreatitis by CT. Small calcifications at the pancreatic head and uncinate process are likely sequela of chronic pancreatitis. 2.  Gallbladder is dilated with calcified stones at the gallbladder neck and likely in the cystic duct. The common bile duct is not dilated. Correlate clinically and consider gallbladder ultrasound. 3.  Cirrhotic liver with portal hypertension and numerous varices. The main portal vein is still patent. 4.  No bowel obstruction, ascites, or pneumoperitoneum. Mild fecalization of the distal ileum but without large fecal load in the ascending colon at this time. US ABDOMEN LIMITED Specify organ? GALLBLADDER, LIVER    Result Date: 2/4/2022  EXAMINATION: RIGHT UPPER QUADRANT ULTRASOUND 2/4/2022 6:58 am COMPARISON: None. HISTORY: ORDERING SYSTEM PROVIDED HISTORY: abd pain TECHNOLOGIST PROVIDED HISTORY: Reason for exam:->abd pain Specify organ?->GALLBLADDER Specify organ?->LIVER Reason for Exam: abd pain Additional signs and symptoms: n/a Relevant Medical/Surgical History: n/a FINDINGS: LIVER:  The liver demonstrates increased echogenicity without evidence of intrahepatic biliary ductal dilatation. BILIARY SYSTEM:  The gallbladder contains multiple shadowing echogenic gallstones. There is no wall thickening or pericholecystic fluid. Common bile duct is within normal limits measuring 5 mm. RIGHT KIDNEY: The right kidney is grossly unremarkable without evidence of hydronephrosis. Incidental note is made of a tiny benign 1.4 cm simple cyst. PANCREAS:  Visualized portions of the pancreas are unremarkable. OTHER: No evidence of right upper quadrant ascites. 1. Fatty infiltration of the liver. 2. Cholelithiasis.         IMPRESSION/RECOMMENDATIONS:    Acute cholecystitis, with stones obstructing neck / cystic duct    The patient has symptomatic gallbladder disease for which I have recommended a laparoscopic cholecystectomy with cholangiogram.    The plan for surgery, risks, benefits, and alternatives have been reviewed with the patient. Will schedule for surgery today. Liver issues reviewed, complicating issues but not prohibitive for surgery,  I will refer to Dr. Addie Ordoñez as outpt for addn evaluation    Thank you.       Franny Leslie MD

## 2022-02-04 NOTE — BRIEF OP NOTE
Brief Postoperative Note      Patient: Myra Rojas  YOB: 1965  MRN: [id number]    Date of Procedure: 2/4/2022    Pre-Op Diagnosis: Cholelithiasis, acute cholecystitis, cirrhosis    Post-Op Diagnosis: Same       Procedure(s):  LAPAROSCOPIC CHOLECYSTECTOMY WITH INTRAOPERATIVE CHOLANGIOGRAM, liver bx    Surgeon(s):  Tacos Blair MD    Assistant:  Surgical Assistant: Jose Morales    Anesthesia: General    Estimated Blood Loss (mL): Minimal    Complications: None    Specimens:   ID Type Source Tests Collected by Time Destination   A : A) GALLBLADDER Tissue Gallbladder SURGICAL PATHOLOGY Tacos Blair MD 2/4/2022 1696    B : B) LIVER BIOPSY Tissue Tissue SURGICAL PATHOLOGY Tacos Blair MD 2/4/2022 1802        Implants:  * No implants in log *      Drains:   [REMOVED] NG/OG/NJ/NE Tube Orogastric 18 fr Center mouth (Removed)       Findings: GB distended and inflamed with stones. IOC clear. Liver cirrhotic appearing. Bx's done. Pics in Epic.     Electronically signed by Tacos Blair MD on 2/4/2022 at 6:25 PM

## 2022-02-04 NOTE — PROGRESS NOTES
Morning assessment completed, bp elevated, denies pain at this time, rapid covid sent, gi consulted for abd pain, The care plan and education has been reviewed and mutually agreed upon with the patient.  Independent in room, PWW.   1515: Pt went down in surgery

## 2022-02-04 NOTE — ANESTHESIA PRE PROCEDURE
Department of Anesthesiology  Preprocedure Note       Name:  Kofi Lott   Age:  62 y.o.  :  1965                                          MRN:  [id number]         Date:  2022      Surgeon: Iman Mitchell):  Jessica Da Silva MD    Procedure: Procedure(s):  LAPAROSCOPIC CHOLECYSTECTOMY WITH INTRAOPERATIVE CHOLANGIOGRAM    Medications prior to admission:   Prior to Admission medications    Medication Sig Start Date End Date Taking? Authorizing Provider   tadalafil (CIALIS) 5 MG tablet Take 5 mg by mouth daily as needed for Erectile Dysfunction   Yes Historical Provider, MD   HYDROcodone-acetaminophen (NORCO) 5-325 MG per tablet Take 1 tablet by mouth every 6 hours as needed for Pain for up to 5 days. Take lowest dose possible to manage pain; may stop taking sooner than 7 days if pain is able to be controlled with non-narcotic analgesics and therapies. 22 Yes GLEN Jimenes - CNP   Multiple Vitamin (MULTIVITAMINS PO) Take  by mouth.     Historical Provider, MD       Current medications:    Current Facility-Administered Medications   Medication Dose Route Frequency Provider Last Rate Last Admin    sodium chloride flush 0.9 % injection 5-40 mL  5-40 mL IntraVENous 2 times per day Enio Ferrer MD   10 mL at 22 0842    sodium chloride flush 0.9 % injection 10 mL  10 mL IntraVENous PRN Jose Keene MD        0.9 % sodium chloride infusion  25 mL IntraVENous PRN Enio Ferrer MD        enoxaparin (LOVENOX) injection 40 mg  40 mg SubCUTAneous Daily Jose Keene MD        ondansetron (ZOFRAN-ODT) disintegrating tablet 4 mg  4 mg Oral Q8H PRN Enio Ferrer MD        Or    ondansetron (ZOFRAN) injection 4 mg  4 mg IntraVENous Q6H PRN Enio Ferrer MD        polyethylene glycol (GLYCOLAX) packet 17 g  17 g Oral Daily PRN Enio Ferrer MD        acetaminophen (TYLENOL) tablet 650 mg  650 mg Oral Q6H PRN Enio Ferrer MD        Or    acetaminophen (TYLENOL) suppository 650 mg  650 mg Rectal Q6H PRN Srikanth Haywood MD        nicotine (NICODERM CQ) 21 MG/24HR 1 patch  1 patch TransDERmal Daily Srikanth Haywood MD   1 patch at 22 0820    lactated ringers infusion   IntraVENous Continuous Srikanth Haywood  mL/hr at 22 0617 New Bag at 22 0617    morphine (PF) injection 2 mg  2 mg IntraVENous Q4H PRN Srikanth Haywood MD        pantoprazole (PROTONIX) tablet 40 mg  40 mg Oral QAM AC Srikanth Haywood MD        calcium carbonate (TUMS) chewable tablet 500 mg  500 mg Oral TID PRN Srikanth Haywood MD        propranolol (INDERAL) tablet 10 mg  10 mg Oral TID Srikanth Haywood MD   10 mg at 22 1334    labetalol (NORMODYNE;TRANDATE) injection 10 mg  10 mg IntraVENous Q4H PRN Srikanth Haywood MD        piperacillin-tazobactam (ZOSYN) 3,375 mg in dextrose 5 % 50 mL IVPB extended infusion (mini-bag)  3,375 mg IntraVENous Q8H Kelley Castillo MD   Stopped at 22 1052    amLODIPine (NORVASC) tablet 5 mg  5 mg Oral Daily GLEN Martinez CNP   5 mg at 22 1413       Allergies:  No Known Allergies    Problem List:    Patient Active Problem List   Diagnosis Code    Elevated INR R79.1    Cirrhosis (HCC) K74.60    Portal hypertension ([facility] [address] K76.6    Ascites R33.1    Alcoholic liver disease ([facility] [address] K70.9    Leukocytosis D72.829    Acute renal insufficiency N28.9    Right upper quadrant abdominal pain R10.11    Cholecystitis K81.9       Past Medical History:        Diagnosis Date    Alcohol abuse     Liver disease     hyperbilirubinemia       Past Surgical History:        Procedure Laterality Date    PARACENTESIS  12/28/10       Social History:    Social History     Tobacco Use    Smoking status: Current Every Day Smoker     Packs/day: 0.50     Years: 20.00     Pack years: 10.00     Types: Cigarettes     Last attempt to quit: 12/3/2010     Years since quittin.1    Smokeless tobacco: Never Used    Tobacco comment: less than half pack/day   Substance Use Topics    Alcohol use: No     Comment: pt states has not drank in a month but did drink before that a 1/2 liter to 3/4 a day. Ready to quit: Not Answered  Counseling given: Not Answered  Comment: less than half pack/day      Vital Signs (Current):   Vitals:    02/04/22 0500 02/04/22 0809 02/04/22 0815 02/04/22 1138   BP: (!) 145/91  (!) 156/80 (!) 160/89   Pulse: 61  58 60   Resp: 16  16 16   Temp: 98.1 °F (36.7 °C)  98.4 °F (36.9 °C) 98.7 °F (37.1 °C)   TempSrc: Oral  Oral Oral   SpO2: 94% 94% 95% 96%   Weight:       Height:                                                  BP Readings from Last 3 Encounters:   02/04/22 (!) 160/89       NPO Status:                                                                                 BMI:   Wt Readings from Last 3 Encounters:   02/04/22 185 lb (83.9 kg)     Body mass index is 25.8 kg/m². CBC:   Lab Results   Component Value Date    WBC 7.1 02/04/2022    RBC 4.72 02/04/2022    HGB 15.0 02/04/2022    HCT 44.7 02/04/2022    MCV 94.8 02/04/2022    RDW 13.8 02/04/2022     02/04/2022       CMP:   Lab Results   Component Value Date     02/04/2022    K 3.8 02/04/2022     02/04/2022    CO2 24 02/04/2022    BUN 22 02/04/2022    CREATININE 0.7 02/04/2022    GFRAA >60 02/04/2022    GFRAA >60 01/04/2011    AGRATIO 0.9 02/04/2022    LABGLOM >60 02/04/2022    GLUCOSE 173 02/04/2022    PROT 7.6 02/04/2022    PROT 7.3 01/04/2011    CALCIUM 9.3 02/04/2022    BILITOT 0.6 02/04/2022    ALKPHOS 187 02/04/2022    AST 42 02/04/2022    ALT 35 02/04/2022       POC Tests: No results for input(s): POCGLU, POCNA, POCK, POCCL, POCBUN, POCHEMO, POCHCT in the last 72 hours.     Coags:   Lab Results   Component Value Date    PROTIME 12.2 02/04/2022    INR 1.08 02/04/2022    APTT 34.9 02/04/2022       HCG (If Applicable): No results found for: PREGTESTUR, PREGSERUM, HCG, HCGQUANT ABGs: No results found for: PHART, PO2ART, RSQ5VHZ, PQT3YJF, BEART, Z7ZUPBEW     Type & Screen (If Applicable):  No results found for: LABABO, LABRH    Drug/Infectious Status (If Applicable):  Lab Results   Component Value Date    HEPCAB Non-Reactive (Negative) 11/30/2010    HEPCAB Non-Reactive (Negative) 11/30/2010       COVID-19 Screening (If Applicable):   Lab Results   Component Value Date    COVID19 Not Detected 02/04/2022           Anesthesia Evaluation  Patient summary reviewed and Nursing notes reviewed no history of anesthetic complications:   Airway: Mallampati: II  TM distance: >3 FB   Neck ROM: full  Mouth opening: > = 3 FB Dental: normal exam         Pulmonary:normal exam  breath sounds clear to auscultation  (+) current smoker    (-) COPD, asthma and sleep apnea                           Cardiovascular:Negative CV ROS        (-) hypertension, past MI, CAD, CABG/stent, dysrhythmias,  angina and  CHF    ECG reviewed  Rhythm: regular  Rate: normal                    Neuro/Psych:   (+) psychiatric history (EtOH abuse, no longer drinking):   (-) seizures, TIA and CVA           GI/Hepatic/Renal:   (+) liver disease (EtOH cirrhosis, Hx of portal hypertension, cirrhosis and esophageal varices): portal hypertension, esophageal varices,      (-) GERD and no renal disease       Endo/Other: Negative Endo/Other ROS       (-) diabetes mellitus, hypothyroidism, hyperthyroidism               Abdominal:             Vascular: Other Findings:             Anesthesia Plan      general     ASA 3       Induction: intravenous. MIPS: Postoperative opioids intended and Prophylactic antiemetics administered. Anesthetic plan and risks discussed with patient. Plan discussed with CRNA.                   Dale Jacobs MD   2/4/2022

## 2022-02-04 NOTE — CARE COORDINATION
Discharge Planning Note:        Chart reviewed and it appears that patient has minimal needs for discharge at this time. Discussed with patients nurse and requested that case management be notified if discharge needs are identified. Case management will continue to follow progress and update discharge plan as needed.       OTONIEL Mcdonald RN      Phone: [phone]

## 2022-02-04 NOTE — H&P
[facility] Medicine History and Physical    2/4/2022    Date of Admission: 2/4/2022    Date of Service: Pt seen/examined on 2/4/2022 and admitted to observation. Assessment/plan:  1. Epigastric abdominal pain, bilateral upper quadrant abdominal pain. There is abnormally dilated gallbladder noted on CTabdomen/pelvis. Will obtain right upper quadrant ultrasound for further evaluation. Start as needed oxycodone and morphine for pain. Make n.p.o. for now. Start Protonix for possible reflux. Patient received a dose of Zosyn in the emergency room; hold further antibiotics pending further evaluation. 2. Elevated blood pressure readings. Suspect previously undiagnosed hypertension. Elevated blood pressure reading could also be secondary to pain. Given history of portal hypertension, will start propranolol 10 mg 3 times daily with hold parameters. Will add as needed IV labetalol for systolic blood pressure greater than 160 mmHg. 3. Hyperglycemia. Check hemoglobin A1c. Monitor glucose closely. 4. Other comorbidities: history of alcohol abuse, cirrhosis without ascites, portal venous hypertension, esophageal varices. Activities: Up with assist  Prophylaxis: Subcutaneous Lovenox, PPI  Code status: Full code    ==========================================================  Chief complaint:  Chief Complaint   Patient presents with    Abdominal Pain     Pt c/o abdominal pain under the ribs starting around 9pm tonight with N/V. Pt denies blood in emesis. Denies trauma to site but states he was exercising yesterday. History of Presenting Illness:   This is a pleasant 62 y.o. male with prior history of alcohol abuse, cirrhosis without ascites, portal venous hypertension, esophageal varices, who presents to the emergency room with complaints of bilateral upper abdominal pain, epigastric abdominal pain, onset since around 10 PM last night, with associated nausea, vomiting, with no diarrhea or fever or chills. He reports radiation of pain into epigastrium and \"teeth. \"  He has received morphine in the emergency room, currently does not have any pain at this time. Alkaline phosphatase is slightly elevated at 187. AST slightly elevated at 42; ALT is normal.  Lipase is normal.  Abnormal finding on CTabdomen/pelvis include pancreatic calcifications, dilated gallbladder with stones of the gallbladder neck, cirrhosis with portal hypertension and numerous varices. Patient received a dose of Zosyn in the emergency room (around 3:15 AM). General surgery has been consulted from the emergency room. Patient is being admitted for further evaluation. Past Medical History:      Diagnosis Date    Alcohol abuse     Liver disease     hyperbilirubinemia       Past Surgical History:      Procedure Laterality Date    PARACENTESIS  12/28/10       Medications (prior to admission):  Prior to Admission medications    Medication Sig Start Date End Date Taking? Authorizing Provider   Multiple Vitamin (MULTIVITAMINS PO) Take  by mouth. Historical Provider, MD       Allergy(ies):  Patient has no known allergies. Social History:  TOBACCO:  reports that he has been smoking cigarettes. He has a 10.00 pack-year smoking history. He has never used smokeless tobacco.  ETOH:  reports no history of alcohol use. Family History:      Problem Relation Age of Onset    Heart Disease Father 48    High Blood Pressure Mother        Review of Systems:  Pertinent positives are listed in HPI. At least 10-point ROS reviewed and were negative. Vitals and physical examination:  BP (!) 195/114   Pulse 73   Temp 97.6 °F (36.4 °C)   Resp 20   Ht 5' 11\" (1.803 m)   Wt 185 lb (83.9 kg)   SpO2 99%   BMI 25.80 kg/m²   Gen/overall appearance: Not in acute distress. Alert. Oriented x3.   Head: Normocephalic, atraumatic  Eyes: EOMI, good acuity  ENT: Oral mucosa moist  Neck: No JVD, thyromegaly  CVS: Nml S1S2, no MRG, RRR  Pulm: Clear bilaterally. No crackles/wheezes  Gastrointestinal: Soft, NT/ND, +BS  Musculoskeletal: No edema. Warm  Neuro: No focal deficit. Moves extremity spontaneously. Psychiatry: Appropriate affect. Not agitated. Skin: Warm, dry with normal turgor. No rash  Capillary refill: Brisk,< 3 seconds   Peripheral Pulses: +2 palpable, equal bilaterally       Labs/imaging/EKG:  CBC:   Recent Labs     02/04/22 0145   WBC 7.1   HGB 15.0   *     BMP:    Recent Labs     02/04/22 0145      K 3.8      CO2 24   BUN 22*   CREATININE 0.7*   GLUCOSE 173*     Hepatic:   Recent Labs     02/04/22 0145   AST 42*   ALT 35   BILITOT 0.6   ALKPHOS 187*       CT ABDOMEN PELVIS W IV CONTRAST Additional Contrast? None    Result Date: 2/4/2022  EXAMINATION: CT OF THE ABDOMEN AND PELVIS WITH CONTRAST 2/4/2022 2:21 am TECHNIQUE: CT of the abdomen and pelvis was performed with the administration of intravenous contrast. Multiplanar reformatted images are provided for review. Dose modulation, iterative reconstruction, and/or weight based adjustment of the mA/kV was utilized to reduce the radiation dose to as low as reasonably achievable. COMPARISON: 12/28/2010 HISTORY: ORDERING SYSTEM PROVIDED HISTORY: Eval for pancreatitis TECHNOLOGIST PROVIDED HISTORY: Additional Contrast?->None Reason for exam:->Eval for pancreatitis Decision Support Exception - unselect if not a suspected or confirmed emergency medical condition->Emergency Medical Condition (MA) Reason for Exam: Eval for pancreatitis Relevant Medical/Surgical History: Abdominal Pain (Pt c/o abdominal pain under the ribs starting around 9pm tonight with N/V. Pt denies blood in emesis. Denies trauma to site but states he was exercising yesterday. ) FINDINGS: Lower Chest: Some dependent atelectasis in the lower lungs but no pneumonia or pleural effusion. Organs: Lobular contour of the liver without primary mass or diffuse nodules identified.   The portal vein is patent with recannulization of the umbilical vein. The gallbladder is dilated with calcified gallstones at the gallbladder neck and likely into the cystic duct. Distal common bile duct is not dilated. The spleen is not enlarged. Extensive varices are noted in the gastroesophageal, gastric, and spleno renal sites. Small calcifications are present in the pancreatic head and uncinate process. The enhancement pattern of the parenchyma appears homogeneous. Pancreatic duct is not dilated and no focal fat stranding/fluid is seen along the pancreatic bed. The adrenal glands are not enlarged. Kidneys are enhancing equally with several small cysts not fully evaluated. There is no hydronephrosis or hydroureter on either side. GI/Bowel: The stomach is under distended but the wall appears thickened. The small bowel and colon are not dilated. Mild fecalization of the terminal ileum but only small fecal load in the ascending colon at this time. There is no sign of appendicitis or acute colonic diverticulitis. There is no ascites or pneumoperitoneum. Pelvis: The urinary bladder wall is not thickened. The prostate is enlarged. Peritoneum/Retroperitoneum: No abdominal aortic aneurysm but does have atherosclerosis and tortuosity. No retroperitoneal hematoma. Mildly enlarged upper abdominal lymph nodes are present in the periportal and gastrohepatic regions. Bones/Soft Tissues: Some varices in the anterior abdominal wall are also present. No acute fracture or destruction is seen at the bones. There are degenerative changes in the spine. 1.  The enhancement of the pancreas is homogeneous without evidence of acute pancreatitis by CT. Small calcifications at the pancreatic head and uncinate process are likely sequela of chronic pancreatitis. 2.  Gallbladder is dilated with calcified stones at the gallbladder neck and likely in the cystic duct. The common bile duct is not dilated.   Correlate clinically and consider gallbladder ultrasound. 3.  Cirrhotic liver with portal hypertension and numerous varices. The main portal vein is still patent. 4.  No bowel obstruction, ascites, or pneumoperitoneum. Mild fecalization of the distal ileum but without large fecal load in the ascending colon at this time. EKG: Normal sinus rhythm, rate 66 bpm.  Inverted T waves in inferior leads. No acute ST changes. . I reviewed EKG. Discussed with ER physician.       Thank you Estela Be MD for the opportunity to be involved in this patient's care.    -----------------------------  Natalie Chadwick MD  [facility]

## 2022-02-04 NOTE — PROGRESS NOTES
Pt arrived from OR to PACU, oral airway in place. VSS, O2 sats 99% on 6 L simple mask. Incisions to abdomen dry and intact, abdomen soft, ice pack in place. Will monitor.

## 2022-02-05 VITALS
HEART RATE: 61 BPM | TEMPERATURE: 98.4 F | RESPIRATION RATE: 16 BRPM | OXYGEN SATURATION: 96 % | HEIGHT: 71 IN | BODY MASS INDEX: 25.9 KG/M2 | DIASTOLIC BLOOD PRESSURE: 83 MMHG | SYSTOLIC BLOOD PRESSURE: 142 MMHG | WEIGHT: 185 LBS

## 2022-02-05 LAB
A/G RATIO: 1.1 (ref 1.1–2.2)
ALBUMIN SERPL-MCNC: 3.4 G/DL (ref 3.4–5)
ALP BLD-CCNC: 100 U/L (ref 40–129)
ALT SERPL-CCNC: 35 U/L (ref 10–40)
ANION GAP SERPL CALCULATED.3IONS-SCNC: 9 MMOL/L (ref 3–16)
AST SERPL-CCNC: 42 U/L (ref 15–37)
BASOPHILS ABSOLUTE: 0 K/UL (ref 0–0.2)
BASOPHILS RELATIVE PERCENT: 0.2 %
BILIRUB SERPL-MCNC: 1.2 MG/DL (ref 0–1)
BUN BLDV-MCNC: 17 MG/DL (ref 7–20)
CALCIUM SERPL-MCNC: 8.8 MG/DL (ref 8.3–10.6)
CHLORIDE BLD-SCNC: 102 MMOL/L (ref 99–110)
CO2: 23 MMOL/L (ref 21–32)
CREAT SERPL-MCNC: 0.8 MG/DL (ref 0.9–1.3)
EOSINOPHILS ABSOLUTE: 0 K/UL (ref 0–0.6)
EOSINOPHILS RELATIVE PERCENT: 0 %
GFR AFRICAN AMERICAN: >60
GFR NON-AFRICAN AMERICAN: >60
GLUCOSE BLD-MCNC: 167 MG/DL (ref 70–99)
HCT VFR BLD CALC: 43.7 % (ref 40.5–52.5)
HEMOGLOBIN: 14.7 G/DL (ref 13.5–17.5)
LIPASE: 24 U/L (ref 13–60)
LYMPHOCYTES ABSOLUTE: 0.8 K/UL (ref 1–5.1)
LYMPHOCYTES RELATIVE PERCENT: 10.6 %
MCH RBC QN AUTO: 31.8 PG (ref 26–34)
MCHC RBC AUTO-ENTMCNC: 33.7 G/DL (ref 31–36)
MCV RBC AUTO: 94.5 FL (ref 80–100)
MONOCYTES ABSOLUTE: 0.8 K/UL (ref 0–1.3)
MONOCYTES RELATIVE PERCENT: 10.7 %
NEUTROPHILS ABSOLUTE: 5.9 K/UL (ref 1.7–7.7)
NEUTROPHILS RELATIVE PERCENT: 78.5 %
PDW BLD-RTO: 13.6 % (ref 12.4–15.4)
PLATELET # BLD: 126 K/UL (ref 135–450)
PMV BLD AUTO: 8.2 FL (ref 5–10.5)
POTASSIUM REFLEX MAGNESIUM: 4.6 MMOL/L (ref 3.5–5.1)
RBC # BLD: 4.63 M/UL (ref 4.2–5.9)
SODIUM BLD-SCNC: 134 MMOL/L (ref 136–145)
TOTAL PROTEIN: 6.6 G/DL (ref 6.4–8.2)
WBC # BLD: 7.5 K/UL (ref 4–11)

## 2022-02-05 PROCEDURE — 2580000003 HC RX 258: Performed by: SURGERY

## 2022-02-05 PROCEDURE — 85025 COMPLETE CBC W/AUTO DIFF WBC: CPT

## 2022-02-05 PROCEDURE — 6360000002 HC RX W HCPCS: Performed by: SURGERY

## 2022-02-05 PROCEDURE — 80053 COMPREHEN METABOLIC PANEL: CPT

## 2022-02-05 PROCEDURE — 83690 ASSAY OF LIPASE: CPT

## 2022-02-05 PROCEDURE — 6370000000 HC RX 637 (ALT 250 FOR IP): Performed by: SURGERY

## 2022-02-05 PROCEDURE — 99024 POSTOP FOLLOW-UP VISIT: CPT | Performed by: SURGERY

## 2022-02-05 PROCEDURE — G0378 HOSPITAL OBSERVATION PER HR: HCPCS

## 2022-02-05 RX ORDER — AMLODIPINE BESYLATE 5 MG/1
5 TABLET ORAL DAILY
Qty: 30 TABLET | Refills: 3 | Status: SHIPPED | OUTPATIENT
Start: 2022-02-06

## 2022-02-05 RX ADMIN — PANTOPRAZOLE SODIUM 40 MG: 40 TABLET, DELAYED RELEASE ORAL at 06:17

## 2022-02-05 RX ADMIN — PIPERACILLIN AND TAZOBACTAM 3375 MG: 3; .375 INJECTION, POWDER, LYOPHILIZED, FOR SOLUTION INTRAVENOUS at 01:21

## 2022-02-05 RX ADMIN — HYDROCODONE BITARTRATE AND ACETAMINOPHEN 2 TABLET: 5; 325 TABLET ORAL at 01:25

## 2022-02-05 RX ADMIN — PIPERACILLIN AND TAZOBACTAM 3375 MG: 3; .375 INJECTION, POWDER, LYOPHILIZED, FOR SOLUTION INTRAVENOUS at 08:29

## 2022-02-05 RX ADMIN — Medication 10 ML: at 08:56

## 2022-02-05 RX ADMIN — ACETAMINOPHEN 650 MG: 325 TABLET ORAL at 08:23

## 2022-02-05 RX ADMIN — SODIUM CHLORIDE 25 ML: 9 INJECTION, SOLUTION INTRAVENOUS at 01:19

## 2022-02-05 ASSESSMENT — PAIN SCALES - GENERAL: PAINLEVEL_OUTOF10: 3

## 2022-02-05 ASSESSMENT — PAIN DESCRIPTION - LOCATION: LOCATION: ABDOMEN

## 2022-02-05 ASSESSMENT — PAIN DESCRIPTION - PAIN TYPE: TYPE: SURGICAL PAIN

## 2022-02-05 NOTE — OP NOTE
[facility]                     [address] [facility], [address] [place] [address]                                OPERATIVE REPORT    PATIENT NAME: Govind Gill                    :        1965  MED REC NO:   [id number]                          ROOM:       [id number]  ACCOUNT NO:   [de-identified]                           ADMIT DATE: 2022  PROVIDER:     Linda Miller MD    DATE OF PROCEDURE:  2022    PREOPERATIVE DIAGNOSES:  Acute cholecystitis and cirrhosis. POSTOPERATIVE DIAGNOSES:  Acute cholecystitis and cirrhosis. PROCEDURE:  Laparoscopic cholecystectomy, intraoperative cholangiogram  angiogram, and Bard-Marquee's needle Kings-Cut liver biopsy x2. SURGEON:  Lnida Miller MD    ANESTHESIA:  General plus local.    ESTIMATED BLOOD LOSS:  Minimal.    COMPLICATIONS:  None. SPECIMEN:  1.  Gallbladder. 2.  Liver biopsy. INDICATIONS:  The patient is a 77-year-old, admitted with acute  cholecystitis for surgery today. Of note, also has chronic alcoholic  disease in the past which has caused likely cirrhosis. The patient has  compensated LFTs. Presents for laparoscopic cholecystectomy and biopsy  at this time. Intended procedure was reviewed with the patient. All  questions were answered and he consents to proceed. ADDITIONAL DETAILS OF SURGERY:  The patient was brought to the operating  room and placed on the operating table in supine position. Compression  boots were placed. General anesthetic was administered. The abdomen  was prepped and draped sterilely, and time-out was done. Supraumbilical  5-mm direct entry port was placed and the abdominal cavity was  insufflated to 15 mmHg pressure. An epigastric 12-mm port and 2 right  lateral abdominal 5-mm ports were placed under direct vision. The upper  abdomen was visualized. The gallbladder was identified.   It was too  distended to grasp, and it was suction aspirated to facilitate grasping  at the fundus. This was done and the infundibulum area was dissected  free of adhesions and retracted out inferolaterally. The area of the  infundibulum and adrian was dissected. There was quite a bit of  pericholecystic edema present in this area and the dissection continued  to the point of establishing the critical angle view with a good  visualization and cystic artery, cystic duct, and node of Calot. A  couple of small branches and small vessels were taken down with clips  and rendered securely, and then the gallbladder cystic duct junction was  clipped and partially transected. The intraoperative cholangiogram  catheter was placed through its Angiocath in the intra-abdominal wall  and cholangiogram was obtained. This appeared normal.  The duct was  flushed thoroughly and appeared clear. The cholangiogram catheter was  removed. The cystic duct was clipped with three clips proximally and  transection of the duct was completed. The patient's main cystic artery  was then clipped with two clips proximal, one clip distally and it was  transected. Bovie electrocautery was used in the gallbladder from the  hepatic fossa. One additional vessel was clipped along the way. The  gallbladder bed was carefully dissected and remained hemostatic. The  gallbladder was then placed in the specimen retrieval sac, removed out  the epigastric 11-mm port site within the bag. It was sent to  pathology. The liver was then examined. The liver was macronodular and  fibrotic in nature and appearance. The Kings-Cut biopsies were done of  the anterior portion of the right side of the liver. The Bard-Marquee's  needle was used for two passes both of which obtained good biopsy  specimens. These were placed on Telfa gauze and submitted for permanent  pathology. The area of the liver biopsies was coagulated and appeared  hemostatic. The gallbladder bed was reinspected and appeared  hemostatic.   The areas of the clips were reexamined, and all clips were  in good location with no bleeding or bile leak noted. The Surgicel  gauze was placed on the liver biopsy site and also in the gallbladder  bed, and region of the dissection of the adrian. The instruments and ports were removed. The port sites appeared  hemostatic. The fascia at the epigastric site was closed with 0 Vicryl  figure-of-eight suture. Skin at all sites closed with 4-0 Monocryl  suture. Dermabond glue was placed. The patient was taken to the  recovery room in stable condition postop. Rey Perkins MD    D: 02/04/2022 18:43:57       T: 02/04/2022 18:47:42     [id number]  Job#: [id number]     Doc#: [id number]    CC:   Celestina Pierre MD

## 2022-02-05 NOTE — PROGRESS NOTES
Pt resting quietly in bed with eyes closed, awakens to voice, rates pain in abdomen as a 5 out of 10 and states it is tolerable for him. VSS, O2 sats 98% on 2 L NC. Incisions to abdomen dry and intact, abdomen soft, ice pack in place. Pt seen by anesthesia, phase 1 criteria met. Will transfer pt to .

## 2022-02-05 NOTE — DISCHARGE SUMMARY
2.  Gallbladder is dilated with calcified stones at the gallbladder neck and   likely in the cystic duct.  The common bile duct is not dilated.  Correlate   clinically and consider gallbladder ultrasound.       3.  Cirrhotic liver with portal hypertension and numerous varices.  The main   portal vein is still patent.       4.  No bowel obstruction, ascites, or pneumoperitoneum.  Mild fecalization of   the distal ileum but without large fecal load in the ascending colon at this   time.      AIC 6.1       Problem-based Hospital Course. The patient sought care in the ED due to abdominal pain. He was noted to have cholelithiasis and was evaluated by surgery and taken to the OR for lap manuel and liver biopsy. His post operative course was uneventful,  ie he was tolerating a diet and was ambulating in the halls prior to admission. He was hypertensive upon admission and was started on therapy with good results. He was prescribed meds at d/c and asked to monitor BP at home. He was encourages to stop smoking and drinking all alcohol. ( report no alcohol in the past month )     Consults. IP CONSULT TO GENERAL SURGERY  IP CONSULT TO HOSPITALIST    Physical examination on discharge day. BP (!) 142/83   Pulse 61   Temp 98.4 °F (36.9 °C) (Oral)   Resp 16   Ht 5' 11\" (1.803 m)   Wt 185 lb (83.9 kg)   SpO2 96%   BMI 25.80 kg/m²   General appearance. Alert. Looks comfortable. HEENT. Sclera clear. Moist mucus membranes. Cardiovascular. Regular rate and rhythm, normal S1, S2. No murmur. Respiratory. Not using accessory muscles. Clear to auscultation bilaterally, no wheeze. Gastrointestinal. Abdomen soft with bowel sounds. Operative incisions are unremarkable   Neurology. Facial symmetry. No speech deficits. Moving all extremities equally. Extremities. No edema in lower extremities. Skin.  Warm, dry, normal turgor    Condition at time of discharge stable     Medication instructions provided to patient at discharge. Medication List      START taking these medications    amLODIPine 5 MG tablet  Commonly known as: NORVASC  Take 1 tablet by mouth daily  Start taking on: February 6, 2022     HYDROcodone-acetaminophen 5-325 MG per tablet  Commonly known as: Norco  Take 1 tablet by mouth every 6 hours as needed for Pain for up to 5 days. Take lowest dose possible to manage pain; may stop taking sooner than 7 days if pain is able to be controlled with non-narcotic analgesics and therapies. CONTINUE taking these medications    Cialis 5 MG tablet  Generic drug: tadalafil     MULTIVITAMINS PO           Where to Get Your Medications      These medications were sent to [place] [id number] [facility],[address], [place] [id number] [facility], [address] [facility]    Hours: 24-hours Phone: [phone]   · amLODIPine 5 MG tablet     You can get these medications from any pharmacy    Bring a paper prescription for each of these medications  · HYDROcodone-acetaminophen 5-325 MG per tablet         Discharge recommendations given to patient. Follow Up. in 1 week   Disposition. home   Activity. As tolerated   Diet: ADULT DIET; Regular      Spent > 30  minutes in discharge process.     Signed:  GLEN Kaufman CNP     2/5/2022 8:24 AM

## 2022-02-05 NOTE — PROGRESS NOTES
Data- discharge order received, pt verbalized agreement to discharge, disposition to previous residence, no needs for HHC/DME. Action- discharge instructions prepared and given to pt, pt verbalized understanding. Medication information packet given r/t NEW and/or CHANGED prescriptions emphasizing name/purpose/side effects, pt verbalized understanding. Discharge instruction summary: Diet- regular, Activity- as tolerated, Primary Care Physician as follows: Mary Michael [phone] f/u appointment in 2 weeks, immunizations reviewed, prescription medications filled in pt pharmacy. Inpatient surgical procedure precautions reviewed. 1. WEIGHT: Admit Weight: 185 lb (83.9 kg) (02/04/22 0109)        Today  Weight: 185 lb (83.9 kg) (02/04/22 0109)       2. O2 SAT.: SpO2: 96 % (02/05/22 0815)    Response- Pt belongings gathered, IV removed. Disposition is home (no HHC/DME needs), transported with nurse, taken to lobby via w/c w/ wheelchair, no complications.

## 2022-02-05 NOTE — PROGRESS NOTES
[facility]        Post-op Note    Pt Name: Pepe Morgan  MRN: [id number]  YOB: 1965  Date of evaluation: 2022    Post-op Day #1    Subjective:    No acute events overnight  Pain controlled  Tolerating diet  Ambulating    Vital Signs:  Patient Vitals for the past 24 hrs:   BP Temp Temp src Pulse Resp SpO2   22 0815 (!) 142/83 98.4 °F (36.9 °C) Oral 61 16 96 %   22 0430 128/78 98.2 °F (36.8 °C) Oral 60 14 96 %   22 (!) 146/86 98.1 °F (36.7 °C) Oral 60 14 95 %   22     14 99 %   22 (!) 184/96 97.5 °F (36.4 °C) Axillary 62 14 99 %   22 (!) 179/95 98.2 °F (36.8 °C) Oral 70 14 95 %   22 191 (!) 171/90   64 10 98 %   22 190 (!) 182/97   69 10 99 %   22 1850 (!) 180/106   75 19 97 %   22 184 (!) 190/116 97.2 °F (36.2 °C) Temporal 78 12 100 %   22 184 (!) 188/119   80 16 99 %   22 183 (!) 179/104   78 17 99 %   22 183 (!) 172/102 97.4 °F (36.3 °C) Temporal 78 19 99 %   22 1615 (!) 187/108 98.9 °F (37.2 °C) Temporal 87 12 96 %      TEMPERATURE HISTORY 24H: Temp (24hrs), Av.2 °F (36.2 °C), Min:92.1 °F (33.4 °C), Max:98.9 °F (37.2 °C)    BLOOD PRESSURE HISTORY: Systolic (37IPM), CHJ:694 , Min:87 , FJM:183    Diastolic (00ISQ), BEATRICE:37, Min:52, Max:125      Intake/Output:    I/O last 3 completed shifts: In: 1000 [I.V.:1000]  Out: 400 [Urine:300; Blood:100]  I/O this shift:  In: 1320.8 [I.V.:1189.1;  IV Piggyback:131.8]  Out: -   Drain/tube Output:           Physical Exam:  General: awake, alert, oriented to  person, place, time  Abdomen: soft, non-distended, appropriate incisional tenderness, incisions clean dry and intact    Labs:  CBC:    Recent Labs     22  0145 22  0556   WBC 7.1 7.5   HGB 15.0 14.7   HCT 44.7 43.7   * 126*     BMP:    Recent Labs     22  0556    134*   K 3.8 4.6    102   CO2 24 23   BUN 22* 17   CREATININE 0.7* 0.8*   GLUCOSE 173* 167*     Hepatic:   Recent Labs     02/04/22 0145 02/05/22  0556   AST 42* 42*   ALT 35 35   BILITOT 0.6 1.2*   ALKPHOS 187* 100     Amylase:   Recent Labs     02/04/22  0145   AMYLASE 57     Lipase:   Recent Labs     02/04/22  0145 02/05/22  0556   LIPASE 53.0 24.0     Mag:    No results for input(s): MG in the last 72 hours. Phos:   No results for input(s): PHOS in the last 72 hours. Coags:   Recent Labs     02/04/22 0145   INR 1.08   APTT 34.9       Cultures:  Anaerobic culture  No results for input(s): LABANAE in the last 72 hours. Blood culture  No results for input(s): BC in the last 72 hours. Blood culture 2  No results for input(s): BLOODCULT2 in the last 72 hours. Body fluid culture  No results for input(s): BLOODCULT2 in the last 72 hours. Surgical culture  No results for input(s): CXSURG in the last 72 hours. Fecal occult  No results for input(s): OCCULTBLDFEC in the last 72 hours. Gram stain  No results for input(s): LABGRAM in the last 72 hours. Stool culture 1  No results for input(s): CXST in the last 72 hours. Stool culture 2  No results for input(s): STOOLCULT2 in the last 72 hours. Urine culture  No results for input(s): LABURIN in the last 72 hours. Wound abscess  No results for input(s): WNDABS in the last 72 hours. C Diff   No results for input(s): CDIFFTOXAB in the last 72 hours. Pathology:   pending    Imaging:  I have personally reviewed the following films:    CT ABDOMEN PELVIS W IV CONTRAST Additional Contrast? None    Result Date: 2/4/2022  EXAMINATION: CT OF THE ABDOMEN AND PELVIS WITH CONTRAST 2/4/2022 2:21 am TECHNIQUE: CT of the abdomen and pelvis was performed with the administration of intravenous contrast. Multiplanar reformatted images are provided for review.  Dose modulation, iterative reconstruction, and/or weight based adjustment of the mA/kV was utilized to reduce the radiation dose to as low as reasonably achievable. COMPARISON: 12/28/2010 HISTORY: ORDERING SYSTEM PROVIDED HISTORY: Eval for pancreatitis TECHNOLOGIST PROVIDED HISTORY: Additional Contrast?->None Reason for exam:->Eval for pancreatitis Decision Support Exception - unselect if not a suspected or confirmed emergency medical condition->Emergency Medical Condition (MA) Reason for Exam: Eval for pancreatitis Relevant Medical/Surgical History: Abdominal Pain (Pt c/o abdominal pain under the ribs starting around 9pm tonight with N/V. Pt denies blood in emesis. Denies trauma to site but states he was exercising yesterday. ) FINDINGS: Lower Chest: Some dependent atelectasis in the lower lungs but no pneumonia or pleural effusion. Organs: Lobular contour of the liver without primary mass or diffuse nodules identified. The portal vein is patent with recannulization of the umbilical vein. The gallbladder is dilated with calcified gallstones at the gallbladder neck and likely into the cystic duct. Distal common bile duct is not dilated. The spleen is not enlarged. Extensive varices are noted in the gastroesophageal, gastric, and spleno renal sites. Small calcifications are present in the pancreatic head and uncinate process. The enhancement pattern of the parenchyma appears homogeneous. Pancreatic duct is not dilated and no focal fat stranding/fluid is seen along the pancreatic bed. The adrenal glands are not enlarged. Kidneys are enhancing equally with several small cysts not fully evaluated. There is no hydronephrosis or hydroureter on either side. GI/Bowel: The stomach is under distended but the wall appears thickened. The small bowel and colon are not dilated. Mild fecalization of the terminal ileum but only small fecal load in the ascending colon at this time. There is no sign of appendicitis or acute colonic diverticulitis. There is no ascites or pneumoperitoneum. Pelvis:  The urinary bladder wall is not thickened. The prostate is enlarged. Peritoneum/Retroperitoneum: No abdominal aortic aneurysm but does have atherosclerosis and tortuosity. No retroperitoneal hematoma. Mildly enlarged upper abdominal lymph nodes are present in the periportal and gastrohepatic regions. Bones/Soft Tissues: Some varices in the anterior abdominal wall are also present. No acute fracture or destruction is seen at the bones. There are degenerative changes in the spine. 1.  The enhancement of the pancreas is homogeneous without evidence of acute pancreatitis by CT. Small calcifications at the pancreatic head and uncinate process are likely sequela of chronic pancreatitis. 2.  Gallbladder is dilated with calcified stones at the gallbladder neck and likely in the cystic duct. The common bile duct is not dilated. Correlate clinically and consider gallbladder ultrasound. 3.  Cirrhotic liver with portal hypertension and numerous varices. The main portal vein is still patent. 4.  No bowel obstruction, ascites, or pneumoperitoneum. Mild fecalization of the distal ileum but without large fecal load in the ascending colon at this time. US ABDOMEN LIMITED Specify organ? GALLBLADDER, LIVER    Result Date: 2/4/2022  EXAMINATION: RIGHT UPPER QUADRANT ULTRASOUND 2/4/2022 6:58 am COMPARISON: None. HISTORY: ORDERING SYSTEM PROVIDED HISTORY: abd pain TECHNOLOGIST PROVIDED HISTORY: Reason for exam:->abd pain Specify organ?->GALLBLADDER Specify organ?->LIVER Reason for Exam: abd pain Additional signs and symptoms: n/a Relevant Medical/Surgical History: n/a FINDINGS: LIVER:  The liver demonstrates increased echogenicity without evidence of intrahepatic biliary ductal dilatation. BILIARY SYSTEM:  The gallbladder contains multiple shadowing echogenic gallstones. There is no wall thickening or pericholecystic fluid. Common bile duct is within normal limits measuring 5 mm.  RIGHT KIDNEY: The right kidney is grossly unremarkable without evidence of hydronephrosis. Incidental note is made of a tiny benign 1.4 cm simple cyst. PANCREAS:  Visualized portions of the pancreas are unremarkable. OTHER: No evidence of right upper quadrant ascites. 1. Fatty infiltration of the liver. 2. Cholelithiasis. FL CHOLANGIOGRAM OR    Result Date: 2/5/2022  EXAMINATION: SPOT IMAGES FROM AN INTRAOPERATIVE CHOLANGIOGRAM 2/4/2022 5:38 pm COMPARISON: None. HISTORY: ORDERING SYSTEM PROVIDED HISTORY: pain TECHNOLOGIST PROVIDED HISTORY: Reason for exam:->pain Reason for Exam: cholecystectomy FLUOROSCOPY DOSE AND TYPE OR TIME AND EXPOSURES: 3 images, 16 seconds fluoroscopy FINDINGS: Status post cholecystectomy. Intraoperative spot images demonstrate no evidence of leak of contrast.  No filling defect to suggest choledocholithiasis. No evidence of common bile duct obstruction. The intraoperative cholangiogram spot images demonstrating no evidence of obstruction or leak status post cholecystectomy. See separate procedure report for full discussion of findings.          Scheduled Meds:   sodium chloride flush  5-40 mL IntraVENous 2 times per day    enoxaparin  40 mg SubCUTAneous Daily    nicotine  1 patch TransDERmal Daily    pantoprazole  40 mg Oral QAM AC    propranolol  10 mg Oral TID    piperacillin-tazobactam  3,375 mg IntraVENous Q8H    amLODIPine  5 mg Oral Daily     Continuous Infusions:   sodium chloride 25 mL (02/05/22 0119)     PRN Meds:.sodium chloride flush, sodium chloride, ondansetron **OR** ondansetron, polyethylene glycol, acetaminophen **OR** acetaminophen, morphine, calcium carbonate, labetalol, HYDROcodone 5 mg - acetaminophen **OR** HYDROcodone 5 mg - acetaminophen, morphine      Assessment:  Patient Active Problem List   Diagnosis    Elevated INR    Cirrhosis ([facility] [address]    Portal hypertension (HCC)    Ascites    Alcoholic liver disease (HCC)    Leukocytosis    Acute renal insufficiency    Right upper quadrant abdominal pain    Cholecystitis  Acute cholecystitis     OR Date 2/4/22, Laparoscopic cholecystectomy, intraoperative cholangiogram  angiogram, and Bard-Marquee's needle [place] liver biopsy x2     Plan:  1. Tolerating diet  2. Pain controlled  3. Ambulating  4. Labs appropriate response to post-cholecystectomy  5. Defer management of remainder of medical comorbidities to primary and consulting teams  6. May discharge from surgical standpoint and follow with Dr. Aakash Rawls office 2 weeks    [facility] ADELINE Mendosa MD, FACS  2/5/2022  11:52 AM

## 2022-02-05 NOTE — PROGRESS NOTES
Patient arrived from PACU to the floor with wife at bedside, alert and oriented X4. VSS, five incision sites in the abdomen with glue CDI. PRN Norco and morphine for pain, pain tolerated very well. Patient on regular diet, Jello and apple sauce given, no nausea ad vomiting. Patient ambulating to bathroom voiding. Independent with standby assist. BP elevated, managing with BP medicine. The care plan and education reviewed and mutually agreed upon with the patient. Fall precautions in place, call light with the reach.

## 2022-02-05 NOTE — PROGRESS NOTES
Morning assessment completed, bp is slightly elevated, pt refused all the bp meds and Lovenox, see mar. 5 lap sites, cdi, closed with surgical glue, Tylenol for pain given, will continue to monitor bp,The care plan and education has been reviewed and mutually agreed upon with the patient. Pt remains free from falls. Fall precautions in place--bed in lowest position, call light within reach, bed alarm in use. Pt aware to call for assistance before getting up.    1100: tolerating regular diet, no nausea, voiding fine, walking in the hallway.  Pt wanted to know when he will be able to go home, sent message to NP.

## 2022-02-07 ENCOUNTER — CARE COORDINATION (OUTPATIENT)
Dept: CASE MANAGEMENT | Age: 57
End: 2022-02-07

## 2022-02-07 NOTE — CARE COORDINATION
Transitions of Care Call:    Patient will call surgeon today and schedule follow up. Patient reports that he is having minimal discomfort at incision sites, denies any redness, swelling, drainage, fever. He is having normal BM's, denies any nausea. Discussed discharge instructions and reviewed medications, he is taking all of his medications as prescribed. CTN will continue with outreach follow up calls. Call within 2 business days of discharge: Yes    Patient: Florentin Levin Patient : 1965   MRN: [id number]  Reason for Admission: cholesystitis, lap manuel  Discharge Date: 22 RARS: Readmission Risk Score: 5.6 ( )      Last Discharge [facility]       Complaint Diagnosis Description Type Department Provider    22 Abdominal Pain Cholecystitis . .. ED to Hosp-Admission (Discharged) (ADMITTED) [facility] JeremyT Freddy Potts MD; Lawerance Liter... Was this an external facility discharge? No Discharge Facility:     Challenges to be reviewed by the provider   Additional needs identified to be addressed with provider: No  none                 Encounter was not routed to provider for escalation. Method of communication with provider: none. Reviewed New or Changed Meds: yes    Do you have what you need at home?  Durable Medical Equipment ordered at discharge: None   Home Health/Outpatient orders at discharge: none   Was patient discharged with a pulse oximeter? No Discussed and confirmed pulse oximeter discharge instructions and when to notify provider or seek emergency care. Patient education provided: Reviewed appropriate site of care based on symptoms and resources available to patient including: PCP, Urgent care clinics and When to call 911. Follow up appointment scheduled within 7 days of discharge: no. If no appointment scheduled, scheduling offered: no, non-[facility] PCP  No future appointments.     Interventions: Obtained and reviewed discharge summary and/or continuity of care documents  Reviewed discharge instructions, medical action plan and red flags with patient who verbalized understanding.     No further follow-up call indicated   Prince Yoder RN

## 2022-02-17 ENCOUNTER — OFFICE VISIT (OUTPATIENT)
Dept: SURGERY | Age: 57
End: 2022-02-17

## 2022-02-17 VITALS — SYSTOLIC BLOOD PRESSURE: 121 MMHG | BODY MASS INDEX: 26.78 KG/M2 | DIASTOLIC BLOOD PRESSURE: 64 MMHG | WEIGHT: 192 LBS

## 2022-02-17 DIAGNOSIS — K80.20 SYMPTOMATIC CHOLELITHIASIS: Primary | ICD-10-CM

## 2022-02-17 PROCEDURE — 99024 POSTOP FOLLOW-UP VISIT: CPT | Performed by: SURGERY

## 2022-02-17 NOTE — PROGRESS NOTES
[facility]                      PATIENT NAME: Debra Johnson     TODAY'S DATE: 2/17/2022    SUBJECTIVE:      Pt. returns to the office today following a laparoscopic cholecystectomy and liver biopsy. He had surgery on 2/4/22 at [facility]. He has been recovering well to date, with progression towards normal activity and diet. He has no complaints today. Preop pain is resolved. OBJECTIVE:     VITALS:  Wt 192 lb (87.1 kg)   BMI 26.78 kg/m²                                  CONSTITUTIONAL:  awake and alert  LUNGS:  clear to auscultation  ABDOMEN:   normal bowel sounds, soft, non-distended, non-tender   INCISIONS: clean, dry, no drainage      Data:    Radiology Review:  Intraoperative cholangiogram was normal.      FINAL DIAGNOSIS:        A. Gallbladder, cholecystectomy:      - Cholelithiasis. - Chronic cholecystitis. B. Liver, biopsy:      - Moderate steatosis with features of steatohepatitis. - Cirrhosis. - See Comment. ASSESSMENT AND PLAN:    62 y.o. male status post LC, IOC, liver bx. Pathology shows cholecystitis and cirrhosis. This was reviewed with the patient today. His recovery is progressing uneventfully, and he is released to normal activity. I will refer to Dr. Oli Bentley - Liver  He will call or return for any additional problems or questions.       Stewart Bennett MD

## 2024-02-12 ENCOUNTER — HOSPITAL ENCOUNTER (EMERGENCY)
Age: 59
Discharge: HOME | End: 2024-02-12
Attending: INTERNAL MEDICINE
Payer: COMMERCIAL

## 2024-02-12 ENCOUNTER — APPOINTMENT (OUTPATIENT)
Dept: GENERAL RADIOLOGY | Age: 59
End: 2024-02-12
Payer: COMMERCIAL

## 2024-02-12 VITALS
SYSTOLIC BLOOD PRESSURE: 178 MMHG | RESPIRATION RATE: 18 BRPM | TEMPERATURE: 98.2 F | DIASTOLIC BLOOD PRESSURE: 94 MMHG | BODY MASS INDEX: 24.51 KG/M2 | HEIGHT: 71 IN | WEIGHT: 175.1 LBS | OXYGEN SATURATION: 96 % | HEART RATE: 104 BPM

## 2024-02-12 DIAGNOSIS — M54.32 SCIATICA OF LEFT SIDE: Primary | ICD-10-CM

## 2024-02-12 PROCEDURE — 99284 EMERGENCY DEPT VISIT MOD MDM: CPT

## 2024-02-12 PROCEDURE — 6370000000 HC RX 637 (ALT 250 FOR IP): Performed by: INTERNAL MEDICINE

## 2024-02-12 PROCEDURE — 72100 X-RAY EXAM L-S SPINE 2/3 VWS: CPT

## 2024-02-12 PROCEDURE — 6360000002 HC RX W HCPCS: Performed by: INTERNAL MEDICINE

## 2024-02-12 PROCEDURE — 96372 THER/PROPH/DIAG INJ SC/IM: CPT

## 2024-02-12 RX ORDER — METHOCARBAMOL 750 MG/1
750 TABLET, FILM COATED ORAL 4 TIMES DAILY
Qty: 20 TABLET | Refills: 0 | Status: SHIPPED | OUTPATIENT
Start: 2024-02-12 | End: 2024-02-17

## 2024-02-12 RX ORDER — KETOROLAC TROMETHAMINE 15 MG/ML
15 INJECTION, SOLUTION INTRAMUSCULAR; INTRAVENOUS ONCE
Status: COMPLETED | OUTPATIENT
Start: 2024-02-12 | End: 2024-02-12

## 2024-02-12 RX ORDER — PREDNISONE 20 MG/1
60 TABLET ORAL ONCE
Status: COMPLETED | OUTPATIENT
Start: 2024-02-12 | End: 2024-02-12

## 2024-02-12 RX ORDER — PREDNISONE 20 MG/1
40 TABLET ORAL DAILY
Qty: 8 TABLET | Refills: 0 | Status: SHIPPED | OUTPATIENT
Start: 2024-02-12 | End: 2024-02-16

## 2024-02-12 RX ADMIN — KETOROLAC TROMETHAMINE 15 MG: 15 INJECTION, SOLUTION INTRAMUSCULAR; INTRAVENOUS at 09:24

## 2024-02-12 RX ADMIN — PREDNISONE 60 MG: 20 TABLET ORAL at 09:23

## 2024-02-12 NOTE — ED PROVIDER NOTES
pack-year smoking history. He has never used smokeless tobacco. He reports that he does not drink alcohol and does not use drugs.    Family history:    Family History   Problem Relation Age of Onset    Heart Disease Father 50    High Blood Pressure Mother        Exam  ED Triage Vitals [02/12/24 0827]   BP Temp Temp Source Pulse Respirations SpO2 Height Weight - Scale   (!) 178/94 98.2 °F (36.8 °C) Oral (!) 104 18 96 % 1.803 m (5' 11\") 79.4 kg (175 lb 1.6 oz)     Nursing note and vitals reviewed.  General: Patient does not appear to be in acute distress.  Head: Atraumatic  ENT: No evidence of angioedema.  Eyes: Anicteric sclera. No discharge.   Neck: Supple. Trachea midline.   Cardiovascular: RRR; heart has a regular rhythm and during my exam heart rate was about 99.  Pulmonary/Chest: Effort normal. No respiratory distress.  Lungs are clear bilaterally.  Abdominal: Soft. No distension.  Abdomen is soft and nondistended  Musculoskeletal: Pain starts with deep palpation mid buttock pain that radiates down the back of the leg.  Neurological: Alert and oriented. Face symmetric. Speech is clear.  Patient does not have any numbness or tingling.  He does not have any left-sided leg weakness.  No evidence of loss of bowel or bladder function.  Skin: Warm and dry. No rash.    Procedures          Radiology  XR LUMBAR SPINE (2-3 VIEWS)   Final Result   No acute osseous abnormality      Degenerative change, greatest at L4 -L5      Punctate density projects in the perispinal region on right the of the L4   vertebral body.  Vascular calcification favored over ureteral stone.   Correlate with clinical presentation             Labs  No results found for this visit on 02/12/24.    SEP-1  Is this patient to be included in the SEP-1 Core Measure due to severe sepsis or septic shock?   No    Screenings                    Medications Given During ED Visit  Medications   ketorolac (TORADOL) injection 15 mg (15 mg IntraMUSCular Given

## (undated) DEVICE — ADHESIVE SKIN CLSR 0.7ML TOP DERMBND ADV

## (undated) DEVICE — GLOVE SURG SZ 6.5 L11.2IN FNGR THK9.8MIL STRW LTX POLYMER

## (undated) DEVICE — APPLIER CLP M/L SHFT DIA5MM 15 LIG LIGAMAX 5

## (undated) DEVICE — LAPAROSCOPY PACK: Brand: MEDLINE INDUSTRIES, INC.

## (undated) DEVICE — SYRINGE MED 30ML STD CLR PLAS LUERLOCK TIP N CTRL DISP

## (undated) DEVICE — APPLICATOR MEDICATED 26 CC SOLUTION HI LT ORNG CHLORAPREP

## (undated) DEVICE — Device

## (undated) DEVICE — SOLUTION IRRIG 1000ML 0.9% SOD CHL USP POUR PLAS BTL

## (undated) DEVICE — TROCAR SLEEVE: Brand: KII ® LOW PROFILE SLEEVE

## (undated) DEVICE — MARQUEE® DISPOSABLE CORE BIOPSY INSTRUMENT, 18G X 20CM: Brand: MARQUEE

## (undated) DEVICE — HYPODERMIC SAFETY NEEDLE: Brand: MAGELLAN

## (undated) DEVICE — SYRINGE MED 10ML TRNSLUC BRL PLUNG BLK MRK POLYPR CTRL

## (undated) DEVICE — GOWN SIRUS NONREIN LG W/TWL: Brand: MEDLINE INDUSTRIES, INC.

## (undated) DEVICE — C-ARM: Brand: UNBRANDED

## (undated) DEVICE — ELECTRODE PT RET AD L9FT HI MOIST COND ADH HYDRGEL CORDED

## (undated) DEVICE — DRAPE,LAP,CHOLE,W/TROUGHS,STERILE: Brand: MEDLINE

## (undated) DEVICE — INDICATED FOR USE DURING OPEN AND LAPAROSCOPIC CHOLECYSTECTOMY PROCEDURES TO INJECT RADIOPAQUE MEDIA THROUGH THE CYSTIC DUCT INTO THE BILIARY TREE.: Brand: AEROSTAT®

## (undated) DEVICE — Z DISCONTINUED USE 2272117 DRAPE SURG 3 QTR N INVASIVE 2 LAYR DISP

## (undated) DEVICE — LAPAROSCOPIC TROCAR SLEEVE/SINGLE USE: Brand: KII® LOW PROFILE OPTICAL ACCESS SYSTEM

## (undated) DEVICE — CORD ES L10FT MPLR LAP

## (undated) DEVICE — BAG RETRIEVAL SPECIMEN SUPERBAG 5 SMALL NYLON ITRODUCER

## (undated) DEVICE — MERCY FAIRFIELD TURNOVER KIT: Brand: MEDLINE INDUSTRIES, INC.

## (undated) DEVICE — SUTURE MCRYL + SZ 4-0 L27IN ABSRB UD L19MM PS-2 3/8 CIR MCP426H

## (undated) DEVICE — PAD N ADH W3XL4IN POLY COT SFT PERF FLM EASILY CUT ABSRB